# Patient Record
Sex: MALE | Race: BLACK OR AFRICAN AMERICAN | URBAN - METROPOLITAN AREA
[De-identification: names, ages, dates, MRNs, and addresses within clinical notes are randomized per-mention and may not be internally consistent; named-entity substitution may affect disease eponyms.]

---

## 2019-10-09 ENCOUNTER — APPOINTMENT (OUTPATIENT)
Dept: PHYSICAL THERAPY | Age: 54
End: 2019-10-09

## 2019-10-15 ENCOUNTER — HOSPITAL ENCOUNTER (OUTPATIENT)
Dept: PHYSICAL THERAPY | Age: 54
Discharge: HOME | End: 2019-10-15
Payer: COMMERCIAL

## 2019-10-15 PROCEDURE — 97162 PT EVAL MOD COMPLEX 30 MIN: CPT | Performed by: GENERAL ACUTE CARE HOSPITAL

## 2019-10-15 PROCEDURE — 97110 THERAPEUTIC EXERCISES: CPT | Performed by: GENERAL ACUTE CARE HOSPITAL

## 2019-10-15 NOTE — PROGRESS NOTES
[facility]  [place] [employer] [facility]  [address] [place] [address]  [phone] fax    Plan of Care/ Statement of Necessity for Physical Therapy Services    Patient name: Walt Dudley Start of Care: 10/15/2019   Referral source: Ayush Mcknight MD : 1965    Medical Diagnosis: Right shoulder pain [M25.511]  Payor: [facility] / Plan: [facility] PPO / Product Type: PPO /  Onset Date:10+ year ago   Treatment Diagnosis: Right shoulder pain [M25.511]   Prior Hospitalization: see medical history Provider#: [id number]   Medications: Verified on Patient summary List    Comorbidities: DM   Prior Level of Function: Functionally Indep, limited with OH reach    The Plan of Care and following information is based on the information from the initial evaluation. Assessment/ key information:   Pt is a 48 y/o male who presents with R shoulder pain. Pt states that pain began several years ago (10+ years) after sustaining a blow to head and fell onto R shoulder. Pt states that over the past 2 years pain has gradually increased. Pt reports having severe pain with current work related duties, as requires repeated overhead work. Pt admits to sleep disturbances at night due to R shoulder pain. Noted moderate limitations and impairments to left shoulder as well. Pt scored a 46/100 on FOTO indicating reduced functional mobility and QOL. Pain: 5/10, sharp and aching  Aggravating factors: laying down, reaching OH, repeated sup/pronation (using wrench)  Alleviating factors: sitting, rest  Occupation: general      Functional deficits: brush/cut hair, donning belt, donning/doffing shirt, working overhead at work, using wrench/screwdriver    Posture: forward flexed posture, forward head  Palpation: +ttp UT. Guarded.      Shoulder AROM:  Flexion:  R 100, L138 Abduction: R 60, L 115 ER: R 20 IR: R 30   FIR: R side of hip, L L3  PROM: R shoulder flexion: 120, abduction 90, ER 40, IR 50   (very guarded throughout)    Shoulder strength:  Flexion: L 3-/5 Abduction: L3-/5   ER: L 3-/5  IR: L 3-/5    C/S screen:   Flexion: 40 degrees Extension: 5-10   Lateral flexion: ~10 foam   Rotation: ~25 degrees       Special tests:   Neers: unable to assess due to high pain level  Nolberto: unable to assess due to high pain level  Speeds: unable to assess due to high pain level          Evaluation Complexity History MEDIUM  Complexity : 1-2 comorbidities / personal factors will impact the outcome/ POC ; Examination HIGH Complexity : 4+ Standardized tests and measures addressing body structure, function, activity limitation and / or participation in recreation  ;Presentation MEDIUM Complexity : Evolving with changing characteristics  ; Clinical Decision Making MEDIUM Complexity : FOTO score of 26-74  Overall Complexity Rating: MEDIUM  Problem List: pain affecting function, decrease ROM, decrease strength, decrease ADL/ functional abilitiies, decrease activity tolerance and decrease flexibility/ joint mobility   Treatment Plan may include any combination of the following: Therapeutic exercise, Therapeutic activities, Neuromuscular re-education, Physical agent/modality, Manual therapy, Patient education and Self Care training  Patient / Family readiness to learn indicated by: asking questions and trying to perform skills  Persons(s) to be included in education: patient (P)  Barriers to Learning/Limitations: None  Patient Goal (s): relief of pain  Patient Self Reported Health Status: good  Rehabilitation Potential: good    Short Term Goals: To be accomplished in 1 weeks:   1) Pt will be independent and compliant with HEP  Long Term Goals: To be accomplished in 4-6 weeks:   1) Pt will score 64/100 on FOTO indicating improved functional mobility and QOL.    2) Pt will display >120 shoulder flexion for reaching into cabinets   3) Pt will display 4/5 strength in R shoulder for performing work related duties   4) Pt will report <2/10 pain with daily functional tasks to show improved function  Frequency / Duration: Patient to be seen 2-3 times per week for 4-6 weeks. Patient/ CarPatient/ Caregiver education and instruction: Diagnosis, prognosis, activity modification and exercises   [x]  Plan of care has been reviewed with SONY Singh, PT 10/15/2019 11:53 AM    ________________________________________________________________________    I certify that the above Therapy Services are being furnished while the patient is under my care. I agree with the treatment plan and certify that this therapy is necessary.     Physician's Signature:____________Date:_________TIME:________    ** Signature, Date and Time must be completed for valid certification **    Please sign and return to [facility]  [address] [place] [address]  [phone] fax

## 2019-10-15 NOTE — PROGRESS NOTES
PT DAILY TREATMENT NOTE 8-    Patient Name: Kraan Castillo  Date:10/15/2019  : 1965  [x]  Patient  Verified  Payor: [employer] / Plan: [id number] PPO / Product Type: PPO /    In time:3:08  Out time:3:45  Total Treatment Time (min): 37  Total Timed Codes (min): 8  1:1 Treatment Time (min): 37   Visit #: 1 of 12    Treatment Area: Right shoulder pain [M25.511]    SUBJECTIVE  Pain Level (0-10 scale): 5/10  Any medication changes, allergies to medications, adverse drug reactions, diagnosis change, or new procedure performed?: [x] No    [] Yes (see summary sheet for update)  Subjective functional status/changes:   [] No changes reported  See POC    OBJECTIVE  Modality rationale: PD               8 min Therapeutic Exercise:  [x] See flow sheet :   Rationale: increase ROM and increase strength to improve the patients ability to perform upper body ADL's               min Patient Education: [x] Review HEP    [] Progressed/Changed HEP based on:   [] positioning   [] body mechanics   [] transfers   [] heat/ice application        Other Objective/Functional Measures:   Justification for Eval Code Complexity:  Patient History : see POC  Examination see exam   Clinical Presentation: evolving  Clinical Decision Making : FOTO : 46 /100    Pain Level (0-10 scale) post treatment: 510    ASSESSMENT/Changes in Function:   Initial evaluation completed, please see POC for complete details.    Pt given the following for initial HEP: scap retractions, c/s retractions, UT stretch, table flexion, wall walks    Patient will continue to benefit from skilled PT services to modify and progress therapeutic interventions, address functional mobility deficits, address ROM deficits, address strength deficits, analyze and address soft tissue restrictions, analyze and cue movement patterns, analyze and modify body mechanics/ergonomics, assess and modify postural abnormalities and instruct in home and community integration to attain remaining goals.      [x]  See Plan of Care  []  See progress note/recertification  []  See Discharge Summary         Progress towards goals / Updated goals:  See POC    PLAN  []  Upgrade activities as tolerated     [x]  Continue plan of care  []  Update interventions per flow sheet       []  Discharge due to:_  []  Other:_      Severa Lama, PT 10/15/2019  4:28 PM

## 2019-10-22 ENCOUNTER — HOSPITAL ENCOUNTER (OUTPATIENT)
Dept: PHYSICAL THERAPY | Age: 54
Discharge: HOME | End: 2019-10-22
Payer: COMMERCIAL

## 2019-10-22 PROCEDURE — 97110 THERAPEUTIC EXERCISES: CPT | Performed by: GENERAL ACUTE CARE HOSPITAL

## 2019-10-22 PROCEDURE — 97140 MANUAL THERAPY 1/> REGIONS: CPT | Performed by: GENERAL ACUTE CARE HOSPITAL

## 2019-10-22 NOTE — PROGRESS NOTES
PT DAILY TREATMENT NOTE     Patient Name: Ricarda Jones [facility]  Date:10/22/2019  : 1965  [x]  Patient  Verified  Payor: [employer] / Plan: [id number] PPO / Product Type: PPO /    In time: 3:32  Out time: 4:12  Total Treatment Time (min): 40  Total Timed Codes (min): 40  1:1 Treatment Time (min): 40   Visit #: 2 of 12    Treatment Area: Right shoulder pain [M25.511]    SUBJECTIVE  Pain Level (0-10 scale): 7/10  Any medication changes, allergies to medications, adverse drug reactions, diagnosis change, or new procedure performed?: [x] No    [] Yes (see summary sheet for update)  Subjective functional status/changes:   [] No changes reported  \"it just hurts anytime I use it. \"    OBJECTIVE  Modality rationale: decrease edema, decrease inflammation and decrease pain to improve the patients ability to tolerate reaching OH and ADL tasks    Min Type Additional Details    [] Estim: []Att   []Unatt  []TENS instruct                 []IFC  []Premod []NMES                       []Other:  []w/US   []w/ice   []w/heat  Position:  Location:    []  Traction: [] Cervical       []Lumbar                       [] Prone          []Supine                       []Intermittent   []Continuous Lbs:  [] before manual  [] after manual    []  Ultrasound: []Continuous   [] Pulsed                           []1MHz   []3MHz Location:  W/cm2:    []  Iontophoresis with dexamethasone         Location: [] Take home patch   [] In clinic   PD []  Ice     []  heat  []  Ice massage Position:  Location:    []  Vasopneumatic Device Pressure: [] lo [] med [] hi   Temp: [] lo [] med [] hi   [] Skin assessment post-treatment:  []intact []redness- no adverse reaction       []redness  adverse reaction:         30 min Therapeutic Exercise:  [x] See flow sheet :   Rationale: increase ROM and increase strength to improve the patients ability to perform upper body ADL's    10 min Manual Therapy:  STM to UT, pec release, PROM R shoulder in all planes with gentle oscillations. Rationale: decrease pain, increase ROM and increase tissue extensibility to perform functional UE tasks            x min Patient Education: [x] Review HEP    [] Progressed/Changed HEP based on:   [] positioning   [] body mechanics   [] transfers   [] heat/ice application        Other Objective/Functional Measures:   R shoulder AROM: flexion 110, abd 70    Pain Level (0-10 scale) post treatment: 4/10    ASSESSMENT/Changes in Function:   Fair tolerance to first FU session, requiring 100% verbal/tactile cues for correct performance of all newly introduced exercises. Noted moderate muscle guarding during PROM. Pt compensates with upper trap during finger ladder and scap retraction, provided tactile cues to correct form. 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 Patient will continue to benefit from skilled PT services to modify and progress therapeutic interventions, address functional mobility deficits, address ROM deficits, address strength deficits, analyze and address soft tissue restrictions, analyze and cue movement patterns, analyze and modify body mechanics/ergonomics, assess and modify postural abnormalities and instruct in home and community integration to attain remaining goals. [x]  See Plan of Care  []  See progress note/recertification  []  See Discharge Summary         Progress towards goals / Updated goals:  Short Term Goals: To be accomplished in 1 weeks:               1) Pt will be independent and compliant with HEP  Long Term Goals: To be accomplished in 4-6 weeks:               1) Pt will score 64/100 on FOTO indicating improved functional mobility and QOL.                2) Pt will display >120 shoulder flexion for reaching into cabinets               3) Pt will display 4/5 strength in R shoulder for performing work related duties               4) Pt will report <2/10 pain with daily functional tasks to show improved function    PLAN  []  Upgrade activities as tolerated     [x]  Continue plan of care  []  Update interventions per flow sheet       []  Discharge due to:_  []  Other:_  Assess response to first tx    Cintia Ayala, PT 10/22/2019  4:28 PM

## 2019-10-24 ENCOUNTER — HOSPITAL ENCOUNTER (OUTPATIENT)
Dept: PHYSICAL THERAPY | Age: 54
Discharge: HOME | End: 2019-10-24
Payer: COMMERCIAL

## 2019-10-24 PROCEDURE — 97110 THERAPEUTIC EXERCISES: CPT

## 2019-10-24 PROCEDURE — 97140 MANUAL THERAPY 1/> REGIONS: CPT

## 2019-10-24 NOTE — PROGRESS NOTES
PT DAILY TREATMENT NOTE     Patient Name: Brant Liu [facility]  Date:10/24/2019  : 1965  [x]  Patient  Verified  Payor: [employer] / Plan: [employer] [id number] PPO / Product Type: PPO /    In time: 4:00  Out time: 4:37  Total Treatment Time (min): 37  Total Timed Codes (min): 32  1:1 Treatment Time (min): 25  Visit #: 3 of 12    Treatment Area: Right shoulder pain [M25.511]    SUBJECTIVE  Pain Level (0-10 scale): 10  Any medication changes, allergies to medications, adverse drug reactions, diagnosis change, or new procedure performed?: [x] No    [] Yes (see summary sheet for update)  Subjective functional status/changes:   [] No changes reported  Pt reports continued increased front of the right shoulder pain. His job requires a lot of overhead repetitive work.      OBJECTIVE  Modality rationale: decrease edema, decrease inflammation and decrease pain to improve the patients ability to tolerate reaching OH and ADL tasks    Min Type Additional Details    [] Estim: []Att   []Unatt  []TENS instruct                 []IFC  []Premod []NMES                       []Other:  []w/US   []w/ice   []w/heat  Position:  Location:    []  Traction: [] Cervical       []Lumbar                       [] Prone          []Supine                       []Intermittent   []Continuous Lbs:  [] before manual  [] after manual    []  Ultrasound: []Continuous   [] Pulsed                           []1MHz   []3MHz Location:  W/cm2:    []  Iontophoresis with dexamethasone         Location: [] Take home patch   [] In clinic   5 []  Ice     []  heat  [x]  Ice massage Position:supine  Location: right subacromial space    []  Vasopneumatic Device Pressure: [] lo [] med [] hi   Temp: [] lo [] med [] hi   [x] Skin assessment post-treatment:  [x]intact []redness- no adverse reaction       []redness  adverse reaction:         21 min Therapeutic Exercise:  [x] See flow sheet :   Rationale: increase ROM and increase strength to improve the patients ability to perform upper body ADL's    11 min Manual Therapy:  TPR right pectorals and levator scap   Rationale: decrease pain, increase ROM and increase tissue extensibility to perform functional UE tasks             min Patient Education: [x] Review HEP    [] Progressed/Changed HEP based on:   [] positioning   [] body mechanics   [] transfers   [] heat/ice application        Other Objective/Functional Measures:   Right forward shoulder posture  Educated on pectoral stretching and posture  Early UT activation with shoulder elevation  Educated on ice massage to try for pain relief  Decreased posterior shoulder strength      Pain Level (0-10 scale) post treatment: 3/10    ASSESSMENT/Changes in Function: Pt limited by increased right shoulder pain with continued overuse for work overhead. He has forward shoulder with increased pectoral tightness and poor posterior kinetic chain strength. Will work to decrease inflammation and restore normal muscle balance for decreased pain with work related duties. Progress towards goals / Updated goals:  Short Term Goals: To be accomplished in 1 weeks:               1) Pt will be independent and compliant with HEP MET  Long Term Goals: To be accomplished in 4-6 weeks:               1) Pt will score 64/100 on FOTO indicating improved functional mobility and QOL.                2) Pt will display >120 shoulder flexion for reaching into cabinets               3) Pt will display 4/5 strength in R shoulder for performing work related duties               4) Pt will report <2/10 pain with daily functional tasks to show improved function    PLAN  [x]  Upgrade activities as tolerated     [x]  Continue plan of care  []  Update interventions per flow sheet       []  Discharge due to:_  []  Other:_    Vivi Olivares PTA 10/24/2019  4:28 PM

## 2019-10-28 ENCOUNTER — HOSPITAL ENCOUNTER (OUTPATIENT)
Dept: PHYSICAL THERAPY | Age: 54
Discharge: HOME | End: 2019-10-28
Payer: COMMERCIAL

## 2019-10-28 PROCEDURE — 97110 THERAPEUTIC EXERCISES: CPT

## 2019-10-28 NOTE — PROGRESS NOTES
PT DAILY TREATMENT NOTE 10-18    Patient Name: Celia Pardo [facility]  Date:10/28/2019  : 1965  [x]  Patient  Verified  Payor: [employer] / Plan: [employer] [id number] PPO / Product Type: PPO /    In time:3:00  Out time:3:52  Total Treatment Time (min): 52  Visit #: 4 of 12    Medicare/BCBS Only   Total Timed Codes (min):  42 1:1 Treatment Time:  25       Treatment Area: Right shoulder pain [M25.511]    SUBJECTIVE  Pain Level (0-10 scale): 4/10  Any medication changes, allergies to medications, adverse drug reactions, diagnosis change, or new procedure performed?: [x] No    [] Yes (see summary sheet for update)  Subjective functional status/changes:   [] No changes reported  Pt reports continued pain on the front of his right shoulder. He wants to get back to doing pushups again. He notes improvement of pain with ice massage, SNAGs and pectoral stretching. OBJECTIVE    42 min Therapeutic Exercise:  [x] See flow sheet :   Rationale: increase ROM and increase strength to improve the patients ability to perform job duties as a  with decreased pain       10 minutes of ice to the right shoulder post exercises in sitting to reduce pain for ease of sleeping and working          With   [] TE   [] TA   [] neuro   [] other: Patient Education: [x] Review HEP    [] Progressed/Changed HEP based on:   [] positioning   [] body mechanics   [] transfers   [] heat/ice application    [] other:      Other Objective/Functional Measures:   Reviewed active release using tennis ball for the pectoral muscles  Educated on posture awareness with scap squeeze throughout the day  Provided with PTB for rows  Educated to hold on anterior strengthening such as push ups due to current muscle imbalanced  Increased UT activation with shoulder motions     Pain Level (0-10 scale) post treatment: 4/10    ASSESSMENT/Changes in Function: Pt continues with moderate right shoulder pain especially with prolonged work.  He has forward shoulder posture with decreased posterior shoulder strength. He has increased UT activation contributing to impingement like symptoms. Will work to restore neutral shoulder positioning versus anterior glided humeral head for reduced pain performing  work. Progress towards goals / Updated goals:  Short Term Goals: To be accomplished in 1 weeks:  1) Pt will be independent and compliant with HEP MET  Long Term Goals: To be accomplished in 4-6 weeks:  1) Pt will score 64/100 on FOTO indicating improved functional mobility and QOL.   2) Pt will display >120 shoulder flexion for reaching into cabinets  3) Pt will display 4/5 strength in R shoulder for performing work related duties  4) Pt will report <2/10 pain with daily functional tasks to show improved function    PLAN  [x]  Upgrade activities as tolerated     [x]  Continue plan of care  []  Update interventions per flow sheet       []  Discharge due to:_  []  Other:_      Zhou Salinas PTA 10/28/2019  2:01 PM    Future Appointments   Date Time Provider Alice Olmos   10/28/2019  3:00 PM Ashlyn Fisher, PTA [facility]   10/30/2019  3:00 PM Ashlyn Fisher, PTA [facility]   11/4/2019  3:00 PM Ashlyn Phillip, PTA [facility]   11/7/2019  3:30 PM Ashlyn Phillip, PTA [facility]   11/12/2019  3:30 PM Ashlyn Phillip, PTA [facility]   11/14/2019  3:00 PM Ashlyn Phillip, PTA [facility]

## 2019-10-30 ENCOUNTER — HOSPITAL ENCOUNTER (OUTPATIENT)
Dept: PHYSICAL THERAPY | Age: 54
Discharge: HOME | End: 2019-10-30
Payer: COMMERCIAL

## 2019-10-30 PROCEDURE — 97110 THERAPEUTIC EXERCISES: CPT

## 2019-10-30 PROCEDURE — 97140 MANUAL THERAPY 1/> REGIONS: CPT

## 2019-10-30 NOTE — PROGRESS NOTES
PT DAILY TREATMENT NOTE 10-18    Patient Name: Shital Romero [facility]  Date:10/30/2019  : 1965  [x]  Patient  Verified  Payor: BLUE [employer]OSS / Plan: [facility] PPO / Product Type: PPO /    In time:3:02  Out time: 3:57  Total Treatment Time (min): 52  Visit #: 5 of 12    Medicare/BCBS Only   Total Timed Codes (min):  42 1:1 Treatment Time:  40       Treatment Area: Right shoulder pain [M25.511]    SUBJECTIVE  Pain Level (0-10 scale): 5-6/10  Any medication changes, allergies to medications, adverse drug reactions, diagnosis change, or new procedure performed?: [x] No    [] Yes (see summary sheet for update)  Subjective functional status/changes:   [] No changes reported  Pt reports having more pain today as he's been lifting a lot at work    OBJECTIVE    Modality rationale: decrease edema, decrease inflammation, decrease pain and increase tissue extensibility to improve the patients ability to tolerate ADLs   Min Type Additional Details    [] Estim:  []Unatt       []IFC  []Premod                        []Other:  []w/ice   []w/heat  Position:  Location:    [] Estim: []Att    []TENS instruct  []NMES                    []Other:  []w/US   []w/ice   []w/heat  Position:  Location:    []  Traction: [] Cervical       []Lumbar                       [] Prone          []Supine                       []Intermittent   []Continuous Lbs:  [] before manual  [] after manual    []  Ultrasound: []Continuous   [] Pulsed                           []1MHz   []3MHz W/cm2:  Location:    []  Iontophoresis with dexamethasone         Location: [] Take home patch   [] In clinic   10 [x]  Ice right GH     [x]  Heat Right upper Trap and pect  []  Ice massage  []  Laser   []  Anodyne Position: supine  Location: as described    []  Laser with stim  []  Other:  Position:  Location:    []  Vasopneumatic Device Pressure:       [] lo [] med [] hi   Temperature: [] lo [] med [] hi   [x] Skin assessment post-treatment:  [x]intact []redness- no adverse reaction    []redness  adverse reaction:       32 min Therapeutic Exercise:  [x] See flow sheet :   Rationale: increase ROM, increase strength, improve coordination and increase proprioception to improve the patients ability to improve ease with ADLs/job duties    10 min Manual Therapy:  DTM/TRP for Right UT and pect   Rationale: decrease pain, increase ROM, increase tissue extensibility and decrease trigger points to tolerate ADLs         With   [] TE   [] TA   [] neuro   [] other: Patient Education: [x] Review HEP    [] Progressed/Changed HEP based on:   [] positioning   [] body mechanics   [] transfers   [] heat/ice application    [] other:      Other Objective/Functional Measures:    AAROM deemed WFL but unable to tolerate AROM, due to pain and weakness? Cont to compensate with UT, improved min after cue     Pain Level (0-10 scale) post treatment: 4/10    ASSESSMENT/Changes in Function: pt demonstrates good improvement with ROM but cont to present with mod pain, fair Mid and Lower trap strength. Will perform trial of KT for UT inhibition next visit. Patient will continue to benefit from skilled PT services to modify and progress therapeutic interventions, address functional mobility deficits, address ROM deficits, address strength deficits, analyze and address soft tissue restrictions, analyze and cue movement patterns, analyze and modify body mechanics/ergonomics, assess and modify postural abnormalities and instruct in home and community integration to attain remaining goals.      [x]  See Plan of Care  []  See progress note/recertification  []  See Discharge Summary         Progress towards goals / Updated goals:  Progress towards goals / Updated goals:  Short Term Goals: To be accomplished in 1 weeks:  1) Pt will be independent and compliant with HEP MET  Long Term Goals: To be accomplished in 4-6 weeks:  1) Pt will score 64/100 on FOTO indicating improved functional mobility and QOL.  2) Pt will display >120 shoulder flexion for reaching into cabinets  3) Pt will display 4/5 strength in R shoulder for performing work related duties  4) Pt will report <2/10 pain with daily functional tasks to show improved function    PLAN  [x]  Upgrade activities as tolerated     [x]  Continue plan of care  []  Update interventions per flow sheet       []  Discharge due to:_  []  Other:_      Akin Horace, PT 10/30/2019  9:51 AM    Future Appointments   Date Time Provider Alice Olmos   10/30/2019  3:00 PM Regino Jeff [id number]   11/4/2019  3:00 PM Paola Layton, PTA [facility]   11/7/2019  3:30 PM Paola Layton, PTA [facility]   11/12/2019  3:30 PM Paola Layton, PTA [facility]   11/14/2019  3:00 PM Paola Layton, PTA [facility]

## 2019-11-04 ENCOUNTER — HOSPITAL ENCOUNTER (OUTPATIENT)
Dept: PHYSICAL THERAPY | Age: 54
Discharge: HOME | End: 2019-11-04
Payer: COMMERCIAL

## 2019-11-04 PROCEDURE — 97110 THERAPEUTIC EXERCISES: CPT

## 2019-11-04 NOTE — PROGRESS NOTES
PT DAILY TREATMENT NOTE 10-18    Patient Name: Prince Olguin [facility]  Date:2019  : 1965  [x]  Patient  Verified  Payor: BLUE CROSS / Plan: [facility] PPO / Product Type: PPO /    In time: 3:00  Out time: 4:02  Total Treatment Time (min): 62  Visit #: 6 of 12    Medicare/BCBS Only   Total Timed Codes (min):  25 1:1 Treatment Time:  52       Treatment Area: Right shoulder pain [M25.511]    SUBJECTIVE  Pain Level (0-10 scale): 6/10  Any medication changes, allergies to medications, adverse drug reactions, diagnosis change, or new procedure performed?: [x] No    [] Yes (see summary sheet for update)  Subjective functional status/changes:   [] No changes reported  Pt reports mod pain along his UT, scalene, and top of Right shoulder.     OBJECTIVE    Modality rationale: decrease edema, decrease inflammation, decrease pain and increase tissue extensibility to improve the patients ability to tolerate ADLs   Min Type Additional Details    [] Estim:  []Unatt       []IFC  []Premod                        []Other:  []w/ice   []w/heat  Position:  Location:    [] Estim: []Att    []TENS instruct  []NMES                    []Other:  []w/US   []w/ice   []w/heat  Position:  Location:    []  Traction: [] Cervical       []Lumbar                       [] Prone          []Supine                       []Intermittent   []Continuous Lbs:  [] before manual  [] after manual    []  Ultrasound: []Continuous   [] Pulsed                           []1MHz   []3MHz W/cm2:  Location:    []  Iontophoresis with dexamethasone         Location: [] Take home patch   [] In clinic   10 [x]  Ice right [address] joint    [x]  Heat Right upper trap and pect  []  Ice massage  []  Laser   []  Anodyne Position: supine  Location: as descirbed    []  Laser with stim  []  Other:  Position:  Location:    []  Vasopneumatic Device Pressure:       [] lo [] med [] hi   Temperature: [] lo [] med [] hi   [] Skin assessment post-treatment:  []intact []redness- no adverse reaction    []redness  adverse reaction:     37 min Therapeutic Exercise:  [x] See flow sheet :   Rationale: increase ROM, increase strength, improve coordination and increase proprioception to improve the patients ability to perform ADLs/job duties    15 min Manual Therapy:  KT to inhibit Right UT   Rationale: decrease pain, increase tissue extensibility and decrease trigger points to improve pt's tolerance for ADLs. With   [] TE   [] TA   [] neuro   [] other: Patient Education: [x] Review HEP    [] Progressed/Changed HEP based on:   [] positioning   [] body mechanics   [] transfers   [] heat/ice application    [] other:      Other Objective/Functional Measures:    Cont to reports most tightness with UT stretch on right   Fear avoidance with using KZ but no increase of pain reported   Unable to correct UT compensation during therex    Max challenged with lower trap and mid trap strengthening in prone    Pain Level (0-10 scale) post treatment: 3-4/10    ASSESSMENT/Changes in Function: pt cont to present with mod pain and compensation with UT. Performed trial of KT, will re-assess next visit, consider Combo to improve pain and muscle flexibility. Progress strengthening as tolerated     Patient will continue to benefit from skilled PT services to modify and progress therapeutic interventions, address functional mobility deficits, address ROM deficits, address strength deficits, analyze and address soft tissue restrictions, analyze and cue movement patterns, analyze and modify body mechanics/ergonomics, assess and modify postural abnormalities, address imbalance/dizziness and instruct in home and community integration to attain remaining goals.      [x]  See Plan of Care  []  See progress note/recertification  []  See Discharge Summary         Progress towards goals / Updated goals:  Short Term Goals: To be accomplished in 1 weeks:  1) Pt will be independent and compliant with HEP MET    Long Term Goals: To be accomplished in 4-6 weeks:  1) Pt will score 64/100 on FOTO indicating improved functional mobility and QOL.   2) Pt will display >120 shoulder flexion for reaching into cabinets  3) Pt will display 4/5 strength in R shoulder for performing work related duties  4) Pt will report <2/10 pain with daily functional tasks to show improved function no change cont to c/o mod pain overall 11-4-19    PLAN  [x]  Upgrade activities as tolerated     [x]  Continue plan of care  []  Update interventions per flow sheet       []  Discharge due to:_  []  Other:_      Yanet Patterson, PT 11/4/2019  12:54 PM    Future Appointments   Date Time Provider Alice Olmos   11/4/2019  3:00 PM William LUCAS [facility]   11/7/2019  3:30 PM Florida Rivera, PTA [facility]   11/12/2019  3:30 PM Florida Rivera, PTA [facility]   11/14/2019  3:00 PM Florida Rivera PTA [facility]

## 2019-11-07 ENCOUNTER — HOSPITAL ENCOUNTER (OUTPATIENT)
Dept: PHYSICAL THERAPY | Age: 54
Discharge: HOME | End: 2019-11-07
Payer: COMMERCIAL

## 2019-11-07 PROCEDURE — 97110 THERAPEUTIC EXERCISES: CPT

## 2019-11-07 NOTE — PROGRESS NOTES
PT DAILY TREATMENT NOTE 10-18    Patient Name: Karan Hernandes [facility]  Date:2019  : 1965  [x]  Patient  Verified  Payor: BLUE CROSS / Plan: [facility] PPO / Product Type: PPO /    In time: 3:32   Out time: 4:34  Total Treatment Time (min):  62  Visit #: 7 of 12     Medicare/BCBS Only   Total Timed Codes (min):  52 1:1 Treatment Time:  30       Treatment Area: Right shoulder pain [M25.511]    SUBJECTIVE  Pain Level (0-10 scale): 10  Any medication changes, allergies to medications, adverse drug reactions, diagnosis change, or new procedure performed?: [x] No    [] Yes (see summary sheet for update)  Subjective functional status/changes:   [] No changes reported  Pt reports increased right shoulder pain from work today. He feels the tape helped with his pain and posture. He has a hard time not shrugging his shoulders when he works. He has right side neck pain as well.        OBJECTIVE    Modality rationale: decrease edema, decrease inflammation, decrease pain and increase tissue extensibility to improve the patients ability to tolerate ADLs   Min Type Additional Details    [] Estim:  []Unatt       []IFC  []Premod                        []Other:  []w/ice   []w/heat  Position:  Location:    [] Estim: []Att    []TENS instruct  []NMES                    []Other:  []w/US   []w/ice   []w/heat  Position:  Location:    []  Traction: [] Cervical       []Lumbar                       [] Prone          []Supine                       []Intermittent   []Continuous Lbs:  [] before manual  [] after manual    []  Ultrasound: []Continuous   [] Pulsed                           []1MHz   []3MHz W/cm2:  Location:    []  Iontophoresis with dexamethasone         Location: [] Take home patch   [] In clinic   10 [x]  Ice right [address] joint    [x]  Heat Right upper trap and pect  []  Ice massage  []  Laser   []  Anodyne Position: supine  Location: as descirbed    []  Laser with stim  []  Other:  Position:  Location: []  Vasopneumatic Device Pressure:       [] lo [] med [] hi   Temperature: [] lo [] med [] hi   [] Skin assessment post-treatment:  []intact []redness- no adverse reaction    []redness  adverse reaction:     42 min Therapeutic Exercise:  [x] See flow sheet :   Rationale: increase ROM, increase strength, improve coordination and increase proprioception to improve the patients ability to perform ADLs/job duties    NC-10 min Manual Therapy:  KT by ATC to inhibit right UT and posterior glide GH joint   Rationale: decrease pain, increase tissue extensibility and decrease trigger points to improve pt's tolerance for ADLs. With   [] TE   [] TA   [] neuro   [] other: Patient Education: [x] Review HEP    [] Progressed/Changed HEP based on:   [] positioning   [] body mechanics   [] transfers   [] heat/ice application    [] other:      Other Objective/Functional Measures:   Challenged with Lower trap facilitation in prone  Added TB Ws to work on posture  Able to reduce UT compensation with increased height of handles on jaden to keep elbows at 90 degrees  Educated to continue with pectoral stretching and be aware of hiking in the shoulders at work     Pain Level (0-10 scale) post treatment: 2/10    ASSESSMENT/Changes in Function: Pt is making slow progress towards goals. He has habitual UT compensations with shoulder mobility and utilizes it daily at work. He has decreased ER and lower trap strength. He continues with forward shoulder posture due to pectoral tightness. Will continue to focus on posterior shoulder strengthening with body awareness to reduce UT compensation during work activities. Progress towards goals / Updated goals:  Short Term Goals: To be accomplished in 1 weeks:  1) Pt will be independent and compliant with HEP MET    Long Term Goals: To be accomplished in 4-6 weeks:  1) Pt will score 64/100 on FOTO indicating improved functional mobility and QOL.   2) Pt will display >120 shoulder flexion for reaching into cabinets  3) Pt will display 4/5 strength in R shoulder for performing work related duties  4) Pt will report <2/10 pain with daily functional tasks to show improved function no change cont to c/o mod pain overall 11-4-19    PLAN  [x]  Upgrade activities as tolerated     [x]  Continue plan of care  []  Update interventions per flow sheet       []  Discharge due to:_  []  Other:_      Agustín Box PTA, PT 11/7/2019  12:54 PM    Future Appointments   Date Time Provider Alice Olmos   11/7/2019  3:30 PM Wells Serum, PTA [facility]   11/12/2019  3:30 PM Wells Serum, PTA [facility]   11/14/2019  3:00 PM Wells Serum, PTA [facility]

## 2019-11-12 ENCOUNTER — HOSPITAL ENCOUNTER (OUTPATIENT)
Dept: PHYSICAL THERAPY | Age: 54
Discharge: HOME | End: 2019-11-12
Payer: COMMERCIAL

## 2019-11-12 PROCEDURE — 97110 THERAPEUTIC EXERCISES: CPT

## 2019-11-12 NOTE — PROGRESS NOTES
PT DAILY TREATMENT NOTE 10-18    Patient Name: Nicki Kingsley Expose  Date:2019  : 1965  [x]  Patient  Verified  Payor: BLUE THAD / Plan: [facility] [employer] [id number] PPO / Product Type: PPO /    In time: 3:30   Out time: 4:20  Total Treatment Time (min):  50  Visit #: 8 of 12     Medicare/BCBS Only   Total Timed Codes (min): 40 1:1 Treatment Time:  30       Treatment Area: Right shoulder pain [M25.511]    SUBJECTIVE  Pain Level (0-10 scale): 2-3/10  Any medication changes, allergies to medications, adverse drug reactions, diagnosis change, or new procedure performed?: [x] No    [] Yes (see summary sheet for update)  Subjective functional status/changes:   [] No changes reported  Pt states overall improvement in pain but still irritated with working and performing long activities. His left knee has been bothering him as well lately going up and down stairs.          OBJECTIVE    Modality rationale: decrease edema, decrease inflammation, decrease pain and increase tissue extensibility to improve the patients ability to tolerate ADLs   Min Type Additional Details    [] Estim:  []Unatt       []IFC  []Premod                        []Other:  []w/ice   []w/heat  Position:  Location:    [] Estim: []Att    []TENS instruct  []NMES                    []Other:  []w/US   []w/ice   []w/heat  Position:  Location:    []  Traction: [] Cervical       []Lumbar                       [] Prone          []Supine                       []Intermittent   []Continuous Lbs:  [] before manual  [] after manual    []  Ultrasound: []Continuous   [] Pulsed                           []1MHz   []3MHz W/cm2:  Location:    []  Iontophoresis with dexamethasone         Location: [] Take home patch   [] In clinic   10 [x]  Ice right [address] joint    [x]  Heat Right upper trap and pect  []  Ice massage  []  Laser   []  Anodyne Position: supine  Location: as descirbed    []  Laser with stim  []  Other:  Position:  Location:    []  Vasopneumatic Device Pressure:       [] lo [] med [] hi   Temperature: [] lo [] med [] hi   [] Skin assessment post-treatment:  []intact []redness- no adverse reaction    []redness  adverse reaction:     40 min Therapeutic Exercise:  [x] See flow sheet :   Rationale: increase ROM, increase strength, improve coordination and increase proprioception to improve the patients ability to perform ADLs/job duties      With   [] TE   [] TA   [] neuro   [] other: Patient Education: [x] Review HEP    [] Progressed/Changed HEP based on:   [] positioning   [] body mechanics   [] transfers   [] heat/ice application    [] other:      Other Objective/Functional Measures: Forward shoulder posture continues  Challenged with lower trap strengthening and scap retraction  Fatigue with ER  No increased pain during session    Pain Level (0-10 scale) post treatment: 0/10    ASSESSMENT/Changes in Function: Pt progressing towards all goals with reduction of pain overall and improve posture awareness. He continues to have forward shoulder posture due to work job type and poor posterior kinetic chain strength. Will continue to improve posture awareness and improve scapulohumeral rhythm with decreased UT compensation for decreased shoulder pain. Progress towards goals / Updated goals:  Short Term Goals: To be accomplished in 1 weeks:  1) Pt will be independent and compliant with HEP MET    Long Term Goals: To be accomplished in 4-6 weeks:  1) Pt will score 64/100 on FOTO indicating improved functional mobility and QOL.   2) Pt will display >120 shoulder flexion for reaching into cabinets  3) Pt will display 4/5 strength in R shoulder for performing work related duties  4) Pt will report <2/10 pain with daily functional tasks to show improved function no change cont to c/o mod pain overall 11-4-19    PLAN  [x]  Upgrade activities as tolerated     [x]  Continue plan of care  []  Update interventions per flow sheet       []  Discharge due to:_  [] Other:_      Ghanshyam Telles PTA, PT 11/12/2019  12:54 PM    Future Appointments   Date Time Provider Alice Olmos   11/12/2019  3:30 PM Iveth Parkinson PTA [facility]   11/14/2019  3:00 PM Iveth Parkinson PTA [facility]

## 2019-11-14 ENCOUNTER — HOSPITAL ENCOUNTER (OUTPATIENT)
Dept: PHYSICAL THERAPY | Age: 54
Discharge: HOME | End: 2019-11-14
Payer: COMMERCIAL

## 2019-11-14 PROCEDURE — 97110 THERAPEUTIC EXERCISES: CPT

## 2019-11-14 NOTE — PROGRESS NOTES
PT DAILY TREATMENT NOTE 10-18    Patient Name: Wendy Mcqueen [facility]  Date:2019  : 1965  [x]  Patient  Verified  Payor: BLUE THAD / Plan: [id number] PPO / Product Type: PPO /    In time: 3:00   Out time: 3:48  Total Treatment Time (min):  48  Visit #:9 of 12    Medicare/BCBS Only   Total Timed Codes (min): 38 1:1 Treatment Time:  38       Treatment Area: Right shoulder pain [M25.511]    SUBJECTIVE  Pain Level (0-10 scale): 1/10  Any medication changes, allergies to medications, adverse drug reactions, diagnosis change, or new procedure performed?: [x] No    [] Yes (see summary sheet for update)  Subjective functional status/changes:   [] No changes reported  Pt reports 3/10 average pain with overall improvements. He is more aware of his posture. He still has pain with prolonged [place] work duties.        OBJECTIVE    Modality rationale: decrease edema, decrease inflammation, decrease pain and increase tissue extensibility to improve the patients ability to tolerate ADLs   Min Type Additional Details    [] Estim:  []Unatt       []IFC  []Premod                        []Other:  []w/ice   []w/heat  Position:  Location:    [] Estim: []Att    []TENS instruct  []NMES                    []Other:  []w/US   []w/ice   []w/heat  Position:  Location:    []  Traction: [] Cervical       []Lumbar                       [] Prone          []Supine                       []Intermittent   []Continuous Lbs:  [] before manual  [] after manual    []  Ultrasound: []Continuous   [] Pulsed                           []1MHz   []3MHz W/cm2:  Location:    []  Iontophoresis with dexamethasone         Location: [] Take home patch   [] In clinic   10 [x]  Ice right [address] joint    [x]  Heat Right upper trap and pect  []  Ice massage  []  Laser   []  Anodyne Position: supine  Location: as descirbed    []  Laser with stim  []  Other:  Position:  Location:    []  Vasopneumatic Device Pressure:       [] lo [] med [] hi   Temperature: [] lo [] med [] hi   [x] Skin assessment post-treatment:  [x]intact []redness- no adverse reaction    []redness  adverse reaction:     38 min Therapeutic Exercise:  [x] See flow sheet :   Rationale: increase ROM, increase strength, improve coordination and increase proprioception to improve the patients ability to perform ADLs/job duties      With   [] TE   [] TA   [] neuro   [] other: Patient Education: [x] Review HEP    [] Progressed/Changed HEP based on:   [] positioning   [] body mechanics   [] transfers   [] heat/ice application    [] other:      Other Objective/Functional Measures: FOTO: 62  Shoulder flexion AROM: 145 degrees  Right shoulder strength: flexion 4/5 with pain abduction 4-/5  IR 4/5 ER 4-/5  Pain average: 3/10    Pain Level (0-10 scale) post treatment: 0/10    ASSESSMENT/Changes in Function: See Progress Note. Progress towards goals / Updated goals:  Short Term Goals: To be accomplished in 1 weeks:  1) Pt will be independent and compliant with HEP MET  Long Term Goals: To be accomplished in 4-6 weeks:  1) Pt will score 64/100 on FOTO indicating improved functional mobility and QOL. Progressing  2) Pt will display >120 shoulder flexion for reaching into cabinets. MET  3) Pt will display 4/5 strength in R shoulder for performing work related dutiesprogressing  4) Pt will report <2/10 pain with daily functional tasks to show.  improved function no change cont to c/o mod pain overall 11-4-19 3/10    PLAN  [x]  Upgrade activities as tolerated     [x]  Continue plan of care  []  Update interventions per flow sheet       []  Discharge due to:_  []  Other:_      Jazmín Morillo PTA, PT 11/14/2019  12:54 PM    Future Appointments   Date Time Provider Alice Olmos   11/14/2019  3:00 PM [facility]

## 2019-11-15 NOTE — PROGRESS NOTES
[facility]  [address] [facility]  [phone] fax    Physical Therapy Progress Note  Patient name: April Melissa Start of Care: 10/15/2019   Referral source: Maria Guadalupe Collazo MD : 1965   Medical/Treatment Diagnosis: Right shoulder pain [M25.511]  Payor: [facility] / Plan: [facility] PPO / Product Type: PPO /  Onset Date:10+ years ago     Prior Hospitalization: see medical history Provider#: [id number]   Medications: Verified on Patient Summary List    Comorbidities: DM  Prior Level of Function:Functionally Indep, limited with [place] reach  Visits from Start of Care: 9    Missed Visits: 0    Established Goals:         Excellent           Good         Limited           None  [x] Increased ROM   []  [x]  []  []  [x] Increased Strength  []  [x]  []  []  [x] Increased Mobility  []  [x]  []  []   [x] Decreased Pain   []  [x]  []  []  [] Decreased Swelling  []  []  []  []    Key Functional Changes: Decreased average pain; Improved AROM; Improved postural awareness    Progress towards goals / Updated goals:  Short Term Goals: To be accomplished in 1 weeks:  1) Pt will be independent and compliant with HEP MET  Long Term Goals: To be accomplished in 4-6 weeks:  1) Pt will score 64/100 on FOTO indicating improved functional mobility and QOL. Progressing  2) Pt will display >120 shoulder flexion for reaching into cabinets. MET  3) Pt will display 4/5 strength in R shoulder for performing work related dutiesprogressing  4) Pt will report <2/10 pain with daily functional tasks to show. improved function no change cont to c/o mod pain overall 19 3/10       Updated Goals: to be achieved in 6 weeks:  1) Pt will score 64/100 on FOTO indicating improved functional mobility and QOL. 2) Pt will display 4/5 strength in R shoulder for performing work related duties  3) Pt will report <2/10 pain with daily work duties.   4) Pt will be independent with final HEP for continued management of pain control upon D/C. ASSESSMENT/RECOMMENDATIONS: Mr. Anton Castillo is making good progress in therapy with decreased average pain to 3/10. He has improving right shoulder AROM but continues to have forward shoulder posture due to pectoral dominance with work duties. He has decreased posterior shoulder strength especially the lower trap and external rotations that are allowing for anterior humeral head translation and impingement symptoms. Skilled PT is medically necessary to improve posture and strength for ease of working with decreased pain. [x]Continue therapy per initial plan/protocol at a frequency of  2 x per week for 6 weeks  []Continue therapy with the following recommended changes:_____________________      _____________________________________________________________________  []Discontinue therapy progressing towards or have reached established goals  []Discontinue therapy due to lack of appreciable progress towards goals  []Discontinue therapy due to lack of attendance or compliance  []Await Physician's recommendations/decisions regarding therapy  []Other:________________________________________________________________    Thank you for this referral.   Negro Perez, PTA 11/15/2019 6:14 PM    NOTE TO PHYSICIAN:  [address]   FAX TO [facility]: [phone]  If you are unable to process this request in 24 hours please contact our office: [phone] I have read the above report and request that my patient continue as recommended. ? I have read the above report and request that my patient continue therapy with the following changes/special instructions:____________________________________  ? I have read the above report and request that my patient be discharged from therapy.     Physicians signature: ______________________________Date: ______Time:______

## 2019-12-26 NOTE — PROGRESS NOTES
[facility] [place]  [address] [facility]  [phone] fax    Physical Therapy Discharge Summary    Patient name: Maged Messer Start of Care: 10/15/2019   Referral source: Claudy Mark MD : 1965   Medical/Treatment Diagnosis: Right shoulder pain [M25.511]  Payor: [facility] / Plan: [facility] PPO / Product Type: PPO /  Onset Date:10+ years ago      Prior Hospitalization: see medical history Provider#: [id number]   Medications: Verified on Patient Summary List     Comorbidities: DM  Prior Level of Function:Functionally Indep, limited with [place] reach    Visits from Start of Care: 9                                      Missed Visits: 0    Reporting Period : 10- to 2019    Summary of Care:  Goal: Pt will score 64/100 on FOTO indicating improved functional mobility and QOL. Status at last note/certification: progressing   Status at discharge: not met    Goal: Pt will display 4/5 strength in R shoulder for performing work related duties  Status at last note/certification: progressing steadily   Status at discharge: not met    Goal: Pt will report <2/10 pain with daily work duties. Status at last note/certification: improved function no change cont to c/o mod pain overall 19 3/10  Status at discharge: not met    Goal: Pt will be independent with final HEP for continued management of pain control upon D/C  Status at last note/certification: not met  Status at discharge: not met      ASSESSMENT/RECOMMENDATIONS: pt making good progress with PT; please see last Progress Note for more details. Pt demonstrates poor compliance with cont PT as planned.      [x]Discontinue therapy: []Patient has reached or is progressing toward set goals      [x]Patient is non-compliant or has abdicated      []Due to lack of appreciable progress towards set goals    Soha Mascorro, PT 2019 11:25 AM

## 2020-06-24 ENCOUNTER — APPOINTMENT (OUTPATIENT)
Dept: PHYSICAL THERAPY | Age: 55
End: 2020-06-24

## 2020-07-31 ENCOUNTER — INPATIENT (INPATIENT)
Facility: HOSPITAL | Age: 55
LOS: 4 days | Discharge: HOME | DRG: 948 | End: 2020-08-05
Attending: INTERNAL MEDICINE | Admitting: INTERNAL MEDICINE
Payer: MEDICAID

## 2020-07-31 VITALS
SYSTOLIC BLOOD PRESSURE: 165 MMHG | DIASTOLIC BLOOD PRESSURE: 89 MMHG | RESPIRATION RATE: 18 BRPM | OXYGEN SATURATION: 100 % | TEMPERATURE: 99 F | HEART RATE: 92 BPM

## 2020-07-31 DIAGNOSIS — D33.2 BENIGN NEOPLASM OF BRAIN, UNSPECIFIED: Chronic | ICD-10-CM

## 2020-07-31 DIAGNOSIS — Z98.890 OTHER SPECIFIED POSTPROCEDURAL STATES: Chronic | ICD-10-CM

## 2020-07-31 DIAGNOSIS — Z89.519 ACQUIRED ABSENCE OF UNSPECIFIED LEG BELOW KNEE: Chronic | ICD-10-CM

## 2020-07-31 DIAGNOSIS — Z95.1 PRESENCE OF AORTOCORONARY BYPASS GRAFT: Chronic | ICD-10-CM

## 2020-07-31 LAB
ALBUMIN SERPL ELPH-MCNC: 3.9 G/DL — SIGNIFICANT CHANGE UP (ref 3.3–5)
ALBUMIN SERPL ELPH-MCNC: 4.5 G/DL — SIGNIFICANT CHANGE UP (ref 3.3–5)
ALP SERPL-CCNC: 88 U/L — SIGNIFICANT CHANGE UP (ref 40–120)
ALP SERPL-CCNC: SIGNIFICANT CHANGE UP (ref 40–120)
ALT FLD-CCNC: 18 U/L — SIGNIFICANT CHANGE UP (ref 10–45)
ALT FLD-CCNC: SIGNIFICANT CHANGE UP (ref 10–45)
ANION GAP SERPL CALC-SCNC: 10 MMOL/L — SIGNIFICANT CHANGE UP (ref 5–17)
ANION GAP SERPL CALC-SCNC: 13 MMOL/L — SIGNIFICANT CHANGE UP (ref 5–17)
APTT BLD: 31.3 SEC — SIGNIFICANT CHANGE UP (ref 27.5–35.5)
AST SERPL-CCNC: 20 U/L — SIGNIFICANT CHANGE UP (ref 10–40)
AST SERPL-CCNC: SIGNIFICANT CHANGE UP (ref 10–40)
BASOPHILS # BLD AUTO: 0.05 K/UL — SIGNIFICANT CHANGE UP (ref 0–0.2)
BASOPHILS NFR BLD AUTO: 0.7 % — SIGNIFICANT CHANGE UP (ref 0–2)
BILIRUB SERPL-MCNC: 0.4 MG/DL — SIGNIFICANT CHANGE UP (ref 0.2–1.2)
BILIRUB SERPL-MCNC: 0.5 MG/DL — SIGNIFICANT CHANGE UP (ref 0.2–1.2)
BUN SERPL-MCNC: 19 MG/DL — SIGNIFICANT CHANGE UP (ref 7–23)
BUN SERPL-MCNC: 21 MG/DL — SIGNIFICANT CHANGE UP (ref 7–23)
CALCIUM SERPL-MCNC: 9.2 MG/DL — SIGNIFICANT CHANGE UP (ref 8.4–10.5)
CALCIUM SERPL-MCNC: 9.3 MG/DL — SIGNIFICANT CHANGE UP (ref 8.4–10.5)
CHLORIDE SERPL-SCNC: 100 MMOL/L — SIGNIFICANT CHANGE UP (ref 96–108)
CHLORIDE SERPL-SCNC: 104 MMOL/L — SIGNIFICANT CHANGE UP (ref 96–108)
CO2 SERPL-SCNC: 26 MMOL/L — SIGNIFICANT CHANGE UP (ref 22–31)
CO2 SERPL-SCNC: 28 MMOL/L — SIGNIFICANT CHANGE UP (ref 22–31)
CREAT SERPL-MCNC: 0.82 MG/DL — SIGNIFICANT CHANGE UP (ref 0.5–1.3)
CREAT SERPL-MCNC: 0.88 MG/DL — SIGNIFICANT CHANGE UP (ref 0.5–1.3)
EOSINOPHIL # BLD AUTO: 0.27 K/UL — SIGNIFICANT CHANGE UP (ref 0–0.5)
EOSINOPHIL NFR BLD AUTO: 3.5 % — SIGNIFICANT CHANGE UP (ref 0–6)
GLUCOSE SERPL-MCNC: 202 MG/DL — HIGH (ref 70–99)
GLUCOSE SERPL-MCNC: 204 MG/DL — HIGH (ref 70–99)
HCT VFR BLD CALC: 40 % — SIGNIFICANT CHANGE UP (ref 39–50)
HGB BLD-MCNC: 12.8 G/DL — LOW (ref 13–17)
IMM GRANULOCYTES NFR BLD AUTO: 0.9 % — SIGNIFICANT CHANGE UP (ref 0–1.5)
INR BLD: 1.12 — SIGNIFICANT CHANGE UP (ref 0.88–1.16)
LYMPHOCYTES # BLD AUTO: 1.85 K/UL — SIGNIFICANT CHANGE UP (ref 1–3.3)
LYMPHOCYTES # BLD AUTO: 24.2 % — SIGNIFICANT CHANGE UP (ref 13–44)
MCHC RBC-ENTMCNC: 27.6 PG — SIGNIFICANT CHANGE UP (ref 27–34)
MCHC RBC-ENTMCNC: 32 GM/DL — SIGNIFICANT CHANGE UP (ref 32–36)
MCV RBC AUTO: 86.2 FL — SIGNIFICANT CHANGE UP (ref 80–100)
MONOCYTES # BLD AUTO: 0.73 K/UL — SIGNIFICANT CHANGE UP (ref 0–0.9)
MONOCYTES NFR BLD AUTO: 9.5 % — SIGNIFICANT CHANGE UP (ref 2–14)
NEUTROPHILS # BLD AUTO: 4.68 K/UL — SIGNIFICANT CHANGE UP (ref 1.8–7.4)
NEUTROPHILS NFR BLD AUTO: 61.2 % — SIGNIFICANT CHANGE UP (ref 43–77)
NRBC # BLD: 0 /100 WBCS — SIGNIFICANT CHANGE UP (ref 0–0)
PLATELET # BLD AUTO: 227 K/UL — SIGNIFICANT CHANGE UP (ref 150–400)
POTASSIUM SERPL-MCNC: 3.9 MMOL/L — SIGNIFICANT CHANGE UP (ref 3.5–5.3)
POTASSIUM SERPL-MCNC: SIGNIFICANT CHANGE UP (ref 3.5–5.3)
POTASSIUM SERPL-SCNC: 3.9 MMOL/L — SIGNIFICANT CHANGE UP (ref 3.5–5.3)
POTASSIUM SERPL-SCNC: SIGNIFICANT CHANGE UP (ref 3.5–5.3)
PROT SERPL-MCNC: 7.8 G/DL — SIGNIFICANT CHANGE UP (ref 6–8.3)
PROT SERPL-MCNC: 8.2 G/DL — SIGNIFICANT CHANGE UP (ref 6–8.3)
PROTHROM AB SERPL-ACNC: 13.4 SEC — SIGNIFICANT CHANGE UP (ref 10.6–13.6)
RBC # BLD: 4.64 M/UL — SIGNIFICANT CHANGE UP (ref 4.2–5.8)
RBC # FLD: 13.7 % — SIGNIFICANT CHANGE UP (ref 10.3–14.5)
SARS-COV-2 RNA SPEC QL NAA+PROBE: SIGNIFICANT CHANGE UP
SODIUM SERPL-SCNC: 139 MMOL/L — SIGNIFICANT CHANGE UP (ref 135–145)
SODIUM SERPL-SCNC: 142 MMOL/L — SIGNIFICANT CHANGE UP (ref 135–145)
TROPONIN T SERPL-MCNC: <0.01 NG/ML — SIGNIFICANT CHANGE UP (ref 0–0.01)
WBC # BLD: 7.65 K/UL — SIGNIFICANT CHANGE UP (ref 3.8–10.5)
WBC # FLD AUTO: 7.65 K/UL — SIGNIFICANT CHANGE UP (ref 3.8–10.5)

## 2020-07-31 PROCEDURE — 99285 EMERGENCY DEPT VISIT HI MDM: CPT | Mod: 25

## 2020-07-31 PROCEDURE — 93010 ELECTROCARDIOGRAM REPORT: CPT

## 2020-07-31 PROCEDURE — 71046 X-RAY EXAM CHEST 2 VIEWS: CPT | Mod: 26

## 2020-07-31 RX ORDER — INSULIN GLARGINE 100 [IU]/ML
20 INJECTION, SOLUTION SUBCUTANEOUS
Qty: 0 | Refills: 0 | DISCHARGE

## 2020-07-31 RX ORDER — ASPIRIN/CALCIUM CARB/MAGNESIUM 324 MG
81 TABLET ORAL DAILY
Refills: 0 | Status: DISCONTINUED | OUTPATIENT
Start: 2020-07-31 | End: 2020-08-05

## 2020-07-31 RX ORDER — NIFEDIPINE 30 MG
60 TABLET, EXTENDED RELEASE 24 HR ORAL DAILY
Refills: 0 | Status: DISCONTINUED | OUTPATIENT
Start: 2020-07-31 | End: 2020-08-05

## 2020-07-31 RX ORDER — ATORVASTATIN CALCIUM 80 MG/1
40 TABLET, FILM COATED ORAL AT BEDTIME
Refills: 0 | Status: DISCONTINUED | OUTPATIENT
Start: 2020-07-31 | End: 2020-08-05

## 2020-07-31 RX ORDER — ASPIRIN/CALCIUM CARB/MAGNESIUM 324 MG
162 TABLET ORAL ONCE
Refills: 0 | Status: COMPLETED | OUTPATIENT
Start: 2020-07-31 | End: 2020-07-31

## 2020-07-31 RX ORDER — METOPROLOL TARTRATE 50 MG
1 TABLET ORAL
Qty: 0 | Refills: 0 | DISCHARGE

## 2020-07-31 RX ORDER — METOPROLOL TARTRATE 50 MG
50 TABLET ORAL DAILY
Refills: 0 | Status: DISCONTINUED | OUTPATIENT
Start: 2020-07-31 | End: 2020-08-05

## 2020-07-31 RX ORDER — ATORVASTATIN CALCIUM 80 MG/1
1 TABLET, FILM COATED ORAL
Qty: 0 | Refills: 0 | DISCHARGE

## 2020-07-31 RX ORDER — HYDROCHLOROTHIAZIDE 25 MG
25 TABLET ORAL DAILY
Refills: 0 | Status: DISCONTINUED | OUTPATIENT
Start: 2020-07-31 | End: 2020-08-05

## 2020-07-31 RX ORDER — NIFEDIPINE 30 MG
1 TABLET, EXTENDED RELEASE 24 HR ORAL
Qty: 0 | Refills: 0 | DISCHARGE

## 2020-07-31 RX ORDER — KETOROLAC TROMETHAMINE 30 MG/ML
15 SYRINGE (ML) INJECTION ONCE
Refills: 0 | Status: DISCONTINUED | OUTPATIENT
Start: 2020-07-31 | End: 2020-07-31

## 2020-07-31 RX ORDER — ENALAPRIL MALEATE AND HYDROCHLOROTHIAZIDE 10; 25 MG/1; MG/1
1 TABLET ORAL
Qty: 0 | Refills: 0 | DISCHARGE

## 2020-07-31 RX ORDER — ASPIRIN/CALCIUM CARB/MAGNESIUM 324 MG
1 TABLET ORAL
Qty: 0 | Refills: 0 | DISCHARGE

## 2020-07-31 RX ADMIN — Medication 15 MILLIGRAM(S): at 22:57

## 2020-07-31 RX ADMIN — Medication 162 MILLIGRAM(S): at 22:23
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
## 2020-07-31 NOTE — ED ADULT NURSE NOTE - CHPI ED NUR SYMPTOMS NEG
no chills/no nausea/no syncope/no dizziness/no shortness of breath/no back pain/no vomiting/no fever/no congestion/no diaphoresis

## 2020-07-31 NOTE — ED PROVIDER NOTE - PHYSICAL EXAMINATION
CONSTITUTIONAL: Well-appearing; well-nourished; in no apparent distress.   HEAD: Normocephalic; atraumatic.   EYES: PERRL; EOM intact; conjunctiva and sclera clear  ENT: normal nose; no rhinorrhea; normal pharynx with no erythema or lesions.   NECK: Supple; non-tender; no LAD  CARDIOVASCULAR: Normal S1, S2; no murmurs, rubs, or gallops. Regular rate and rhythm. midsternal healed scar   RESPIRATORY: Breathing easily; breath sounds clear and equal bilaterally; no wheezes, rhonchi, or rales.  GI: Soft; non-distended; non-tender; no palpable organomegaly.   MSK: FROM at all extremities, normal tone   EXT: b/l BKA   SKIN: Normal for age and race; warm; dry; good turgor; no apparent lesions or rash.   NEURO: A & O x 3; face symmetric; grossly unremarkable.   PSYCHOLOGICAL: The patient’s mood and manner are appropriate.
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
## 2020-07-31 NOTE — H&P ADULT - NSICDXPASTSURGICALHX_GEN_ALL_CORE_FT
PAST SURGICAL HISTORY:  S/P BKA (below knee amputation)     S/P CABG (coronary artery bypass graft) PAST SURGICAL HISTORY:  Benign brain tumor s/p excision    S/P BKA (below knee amputation)     S/P CABG (coronary artery bypass graft)     S/P cervical discectomy

## 2020-07-31 NOTE — H&P ADULT - NSICDXPASTMEDICALHX_GEN_ALL_CORE_FT
PAST MEDICAL HISTORY:  CAD (coronary artery disease)     Diabetes     HTN (hypertension)     Hyperlipidemia
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
## 2020-07-31 NOTE — ED PROVIDER NOTE - DIAGNOSTIC INTERPRETATION
ER PA: Jamia Duke, PAC  CHEST XRAY INTERPRETATION: lungs clear, heart shadow normal, bony structures intact

## 2020-07-31 NOTE — H&P ADULT - PROBLEM SELECTOR PLAN 1
currently chest pain free, EKG NSR@93BPM with no acute ST/T wave changes. CXR unremarkable. Labs notable for: cardiac enzymes negative x 1 set, COVID PCR pending.  --will F/U cardiac enzymes@1AM and 5AM.  --obtain Echocardiogram in AM and discuss further ischemic eval as patient has not been seen by a cardiologist in >2 yrs.
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
## 2020-08-01 DIAGNOSIS — I25.10 ATHEROSCLEROTIC HEART DISEASE OF NATIVE CORONARY ARTERY WITHOUT ANGINA PECTORIS: ICD-10-CM

## 2020-08-01 DIAGNOSIS — I10 ESSENTIAL (PRIMARY) HYPERTENSION: ICD-10-CM

## 2020-08-01 DIAGNOSIS — E11.9 TYPE 2 DIABETES MELLITUS WITHOUT COMPLICATIONS: ICD-10-CM

## 2020-08-01 DIAGNOSIS — R07.9 CHEST PAIN, UNSPECIFIED: ICD-10-CM

## 2020-08-01 LAB
A1C WITH ESTIMATED AVERAGE GLUCOSE RESULT: 6.9 % — HIGH (ref 4–5.6)
ANION GAP SERPL CALC-SCNC: 10 MMOL/L — SIGNIFICANT CHANGE UP (ref 5–17)
BUN SERPL-MCNC: 24 MG/DL — HIGH (ref 7–23)
CALCIUM SERPL-MCNC: 9.2 MG/DL — SIGNIFICANT CHANGE UP (ref 8.4–10.5)
CHLORIDE SERPL-SCNC: 105 MMOL/L — SIGNIFICANT CHANGE UP (ref 96–108)
CHOLEST SERPL-MCNC: 123 MG/DL — SIGNIFICANT CHANGE UP (ref 10–199)
CK MB CFR SERPL CALC: 2.8 NG/ML — SIGNIFICANT CHANGE UP (ref 0–6.7)
CK MB CFR SERPL CALC: 2.8 NG/ML — SIGNIFICANT CHANGE UP (ref 0–6.7)
CK SERPL-CCNC: 119 U/L — SIGNIFICANT CHANGE UP (ref 30–200)
CK SERPL-CCNC: 141 U/L — SIGNIFICANT CHANGE UP (ref 30–200)
CO2 SERPL-SCNC: 28 MMOL/L — SIGNIFICANT CHANGE UP (ref 22–31)
CREAT SERPL-MCNC: 0.96 MG/DL — SIGNIFICANT CHANGE UP (ref 0.5–1.3)
ESTIMATED AVERAGE GLUCOSE: 151 MG/DL — HIGH (ref 68–114)
GLUCOSE BLDC GLUCOMTR-MCNC: 114 MG/DL — HIGH (ref 70–99)
GLUCOSE BLDC GLUCOMTR-MCNC: 125 MG/DL — HIGH (ref 70–99)
GLUCOSE BLDC GLUCOMTR-MCNC: 162 MG/DL — HIGH (ref 70–99)
GLUCOSE BLDC GLUCOMTR-MCNC: 167 MG/DL — HIGH (ref 70–99)
GLUCOSE SERPL-MCNC: 116 MG/DL — HIGH (ref 70–99)
HCT VFR BLD CALC: 38.6 % — LOW (ref 39–50)
HDLC SERPL-MCNC: 34 MG/DL — LOW
HGB BLD-MCNC: 12.3 G/DL — LOW (ref 13–17)
LIPID PNL WITH DIRECT LDL SERPL: 63 MG/DL — SIGNIFICANT CHANGE UP
MAGNESIUM SERPL-MCNC: 2.3 MG/DL — SIGNIFICANT CHANGE UP (ref 1.6–2.6)
MCHC RBC-ENTMCNC: 28.1 PG — SIGNIFICANT CHANGE UP (ref 27–34)
MCHC RBC-ENTMCNC: 31.9 GM/DL — LOW (ref 32–36)
MCV RBC AUTO: 88.1 FL — SIGNIFICANT CHANGE UP (ref 80–100)
NRBC # BLD: 0 /100 WBCS — SIGNIFICANT CHANGE UP (ref 0–0)
PLATELET # BLD AUTO: 202 K/UL — SIGNIFICANT CHANGE UP (ref 150–400)
POTASSIUM SERPL-MCNC: 3.8 MMOL/L — SIGNIFICANT CHANGE UP (ref 3.5–5.3)
POTASSIUM SERPL-SCNC: 3.8 MMOL/L — SIGNIFICANT CHANGE UP (ref 3.5–5.3)
RBC # BLD: 4.38 M/UL — SIGNIFICANT CHANGE UP (ref 4.2–5.8)
RBC # FLD: 13.5 % — SIGNIFICANT CHANGE UP (ref 10.3–14.5)
SODIUM SERPL-SCNC: 143 MMOL/L — SIGNIFICANT CHANGE UP (ref 135–145)
TOTAL CHOLESTEROL/HDL RATIO MEASUREMENT: 3.6 RATIO — SIGNIFICANT CHANGE UP (ref 3.4–9.6)
TRIGL SERPL-MCNC: 131 MG/DL — SIGNIFICANT CHANGE UP (ref 10–149)
TROPONIN T SERPL-MCNC: 0.01 NG/ML — SIGNIFICANT CHANGE UP (ref 0–0.01)
TROPONIN T SERPL-MCNC: 0.01 NG/ML — SIGNIFICANT CHANGE UP (ref 0–0.01)
WBC # BLD: 6.78 K/UL — SIGNIFICANT CHANGE UP (ref 3.8–10.5)
WBC # FLD AUTO: 6.78 K/UL — SIGNIFICANT CHANGE UP (ref 3.8–10.5)

## 2020-08-01 PROCEDURE — 93306 TTE W/DOPPLER COMPLETE: CPT | Mod: 26

## 2020-08-01 PROCEDURE — 99223 1ST HOSP IP/OBS HIGH 75: CPT

## 2020-08-01 RX ORDER — DEXTROSE 50 % IN WATER 50 %
25 SYRINGE (ML) INTRAVENOUS ONCE
Refills: 0 | Status: DISCONTINUED | OUTPATIENT
Start: 2020-08-01 | End: 2020-08-05

## 2020-08-01 RX ORDER — ENOXAPARIN SODIUM 100 MG/ML
40 INJECTION SUBCUTANEOUS DAILY
Refills: 0 | Status: DISCONTINUED | OUTPATIENT
Start: 2020-08-01 | End: 2020-08-05

## 2020-08-01 RX ORDER — INSULIN LISPRO 100/ML
VIAL (ML) SUBCUTANEOUS
Refills: 0 | Status: DISCONTINUED | OUTPATIENT
Start: 2020-08-01 | End: 2020-08-05

## 2020-08-01 RX ORDER — INSULIN GLARGINE 100 [IU]/ML
20 INJECTION, SOLUTION SUBCUTANEOUS AT BEDTIME
Refills: 0 | Status: DISCONTINUED | OUTPATIENT
Start: 2020-08-01 | End: 2020-08-05

## 2020-08-01 RX ORDER — SODIUM CHLORIDE 9 MG/ML
1000 INJECTION, SOLUTION INTRAVENOUS
Refills: 0 | Status: DISCONTINUED | OUTPATIENT
Start: 2020-08-01 | End: 2020-08-05

## 2020-08-01 RX ORDER — ACETAMINOPHEN 500 MG
650 TABLET ORAL EVERY 6 HOURS
Refills: 0 | Status: DISCONTINUED | OUTPATIENT
Start: 2020-08-01 | End: 2020-08-05

## 2020-08-01 RX ORDER — DEXTROSE 50 % IN WATER 50 %
12.5 SYRINGE (ML) INTRAVENOUS ONCE
Refills: 0 | Status: DISCONTINUED | OUTPATIENT
Start: 2020-08-01 | End: 2020-08-05

## 2020-08-01 RX ORDER — DEXTROSE 50 % IN WATER 50 %
15 SYRINGE (ML) INTRAVENOUS ONCE
Refills: 0 | Status: DISCONTINUED | OUTPATIENT
Start: 2020-08-01 | End: 2020-08-05

## 2020-08-01 RX ORDER — POTASSIUM CHLORIDE 20 MEQ
30 PACKET (EA) ORAL ONCE
Refills: 0 | Status: COMPLETED | OUTPATIENT
Start: 2020-08-01 | End: 2020-08-01

## 2020-08-01 RX ORDER — GLUCAGON INJECTION, SOLUTION 0.5 MG/.1ML
1 INJECTION, SOLUTION SUBCUTANEOUS ONCE
Refills: 0 | Status: DISCONTINUED | OUTPATIENT
Start: 2020-08-01 | End: 2020-08-05

## 2020-08-01 RX ADMIN — Medication 60 MILLIGRAM(S): at 09:29

## 2020-08-01 RX ADMIN — Medication 25 MILLIGRAM(S): at 07:45

## 2020-08-01 RX ADMIN — Medication 2: at 16:57

## 2020-08-01 RX ADMIN — Medication 30 MILLIEQUIVALENT(S): at 09:29

## 2020-08-01 RX ADMIN — Medication 650 MILLIGRAM(S): at 10:07

## 2020-08-01 RX ADMIN — Medication 650 MILLIGRAM(S): at 09:29

## 2020-08-01 RX ADMIN — Medication 15 MILLIGRAM(S): at 00:23

## 2020-08-01 RX ADMIN — Medication 10 MILLIGRAM(S): at 05:40

## 2020-08-01 RX ADMIN — ENOXAPARIN SODIUM 40 MILLIGRAM(S): 100 INJECTION SUBCUTANEOUS at 11:42

## 2020-08-01 RX ADMIN — INSULIN GLARGINE 20 UNIT(S): 100 INJECTION, SOLUTION SUBCUTANEOUS at 22:40

## 2020-08-01 RX ADMIN — Medication 50 MILLIGRAM(S): at 05:41

## 2020-08-01 RX ADMIN — ATORVASTATIN CALCIUM 40 MILLIGRAM(S): 80 TABLET, FILM COATED ORAL at 23:04

## 2020-08-01 RX ADMIN — Medication 81 MILLIGRAM(S): at 11:42

## 2020-08-01 NOTE — PROGRESS NOTE ADULT - PROBLEM SELECTOR PLAN 4
continue FS's, Lantus 20units subQ q hs and ICS PRN    VTE PPx: Lovenox  Dispo: d/c in AM, needs      Case d/w Dr. Ruiz

## 2020-08-01 NOTE — ED ADULT NURSE REASSESSMENT NOTE - NS ED NURSE REASSESS COMMENT FT1
Attempted to call report, pt unable to accept pt at this time. [Subsequent Evaluation] : a subsequent evaluation for [FreeTextEntry2] : sinusitis.

## 2020-08-01 NOTE — PATIENT PROFILE ADULT - SURGICAL SITE INCISION
no Odomzo Counseling- I discussed with the patient the risks of Odomzo including but not limited to nausea, vomiting, diarrhea, constipation, weight loss, changes in the sense of taste, decreased appetite, muscle spasms, and hair loss. The patient verbalized understanding of the proper use and possible adverse effects of Odomzo. All of the patient's questions and concerns were addressed. Metronidazole Counseling:  I discussed with the patient the risks of metronidazole including but not limited to seizures, nausea/vomiting, a metallic taste in the mouth, nausea/vomiting and severe allergy. Elidel Pregnancy And Lactation Text: This medication is Pregnancy Category C. It is unknown if this medication is excreted in breast milk. Detail Level: Simple Terbinafine Counseling: Patient counseling regarding adverse effects of terbinafine including but not limited to headache, diarrhea, rash, upset stomach, liver function test abnormalities, itching, taste/smell disturbance, nausea, abdominal pain, and flatulence. There is a rare possibility of liver failure that can occur when taking terbinafine. The patient understands that a baseline LFT and kidney function test may be required. The patient verbalized understanding of the proper use and possible adverse effects of terbinafine. All of the patient's questions and concerns were addressed. Terbinafine Pregnancy And Lactation Text: This medication is Pregnancy Category B and is considered safe during pregnancy. It is also excreted in breast milk and breast feeding isn't recommended. Cosentyx Counseling:  I discussed with the patient the risks of Cosentyx including but not limited to worsening of Crohn's disease, immunosuppression, allergic reactions and infections. The patient understands that monitoring is required including a PPD at baseline and must alert us or the primary physician if symptoms of infection or other concerning signs are noted. Clofazimine Pregnancy And Lactation Text: This medication is Pregnancy Category C and isn't considered safe during pregnancy. It is excreted in breast milk. Topical Retinoid counseling:  Patient advised to apply a pea-sized amount only at bedtime and wait 30 minutes after washing their face before applying. If too drying, patient may add a non-comedogenic moisturizer. The patient verbalized understanding of the proper use and possible adverse effects of retinoids. All of the patient's questions and concerns were addressed. Tremfya Counseling: I discussed with the patient the risks of guselkumab including but not limited to immunosuppression, serious infections, worsening of inflammatory bowel disease and drug reactions. The patient understands that monitoring is required including a PPD at baseline and must alert us or the primary physician if symptoms of infection or other concerning signs are noted. Valtrex Pregnancy And Lactation Text: this medication is Pregnancy Category B and is considered safe during pregnancy. This medication is not directly found in breast milk but it's metabolite acyclovir is present. Colchicine Counseling:  Patient counseled regarding adverse effects including but not limited to stomach upset (nausea, vomiting, stomach pain, or diarrhea). Patient instructed to limit alcohol consumption while taking this medication. Colchicine may reduce blood counts especially with prolonged use. The patient understands that monitoring of kidney function and blood counts may be required, especially at baseline. The patient verbalized understanding of the proper use and possible adverse effects of colchicine. All of the patient's questions and concerns were addressed. Use Enhanced Medication Counseling?: No Tremfya Pregnancy And Lactation Text: The risk during pregnancy and breastfeeding is uncertain with this medication. Metronidazole Pregnancy And Lactation Text: This medication is Pregnancy Category B and considered safe during pregnancy. It is also excreted in breast milk. Odomzo Pregnancy And Lactation Text: This medication is Pregnancy Category X and is absolutely contraindicated during pregnancy. It is unknown if it is excreted in breast milk. Eucrisa Counseling: Patient may experience a mild burning sensation during topical application. [place] Sleet is not approved in children less than 3years of age. Dupixent Counseling: I discussed with the patient the risks of dupilumab including but not limited to eye infection and irritation, cold sores, injection site reactions, worsening of asthma, allergic reactions and increased risk of parasitic infection. Live vaccines should be avoided while taking dupilumab. Dupilumab will also interact with certain medications such as warfarin and cyclosporine. The patient understands that monitoring is required and they must alert us or the primary physician if symptoms of infection or other concerning signs are noted. Cosentyx Pregnancy And Lactation Text: This medication is Pregnancy Category B and is considered safe during pregnancy. It is unknown if this medication is excreted in breast milk. Acitretin Counseling:  I discussed with the patient the risks of acitretin including but not limited to hair loss, dry lips/skin/eyes, liver damage, hyperlipidemia, depression/suicidal ideation, photosensitivity. Serious rare side effects can include but are not limited to pancreatitis, pseudotumor cerebri, bony changes, clot formation/stroke/heart attack. Patient understands that alcohol is contraindicated since it can result in liver toxicity and significantly prolong the elimination of the drug by many years. Acitretin Pregnancy And Lactation Text: This medication is Pregnancy Category X and should not be given to women who are pregnant or may become pregnant in the future. This medication is excreted in breast milk. Dupixent Pregnancy And Lactation Text: This medication likely crosses the placenta but the risk for the fetus is uncertain. This medication is excreted in breast milk. Eucrisa Pregnancy And Lactation Text: This medication has not been assigned a Pregnancy Risk Category but animal studies failed to show danger with the topical medication. It is unknown if the medication is excreted in breast milk. Xeljanz Counseling: Nelda Hurtado Counseling: I discussed with the patient the risks of Nelda Grate therapy including increased risk of infection, liver issues, headache, diarrhea, or cold symptoms. Live vaccines should be avoided. They were instructed to call if they have any problems. Otezla Counseling: Yisel Newness Counseling: The side effects of Yisel Newness were discussed with the patient, including but not limited to worsening or new depression, weight loss, diarrhea, nausea, upper respiratory tract infection, and headache. Patient instructed to call the office should any adverse effect occur. The patient verbalized understanding of the proper use and possible adverse effects of Otezla. All the patient's questions and concerns were addressed. Minocycline Counseling: Patient advised regarding possible photosensitivity and discoloration of the teeth, skin, lips, tongue and gums. Patient instructed to avoid sunlight, if possible. When exposed to sunlight, patients should wear protective clothing, sunglasses, and sunscreen. The patient was instructed to call the office immediately if the following severe adverse effects occur:  hearing changes, easy bruising/bleeding, severe headache, or vision changes. The patient verbalized understanding of the proper use and possible adverse effects of minocycline. All of the patient's questions and concerns were addressed. Tazorac Counseling:  Patient advised that medication is irritating and drying. Patient may need to apply sparingly and wash off after an hour before eventually leaving it on overnight. The patient verbalized understanding of the proper use and possible adverse effects of tazorac. All of the patient's questions and concerns were addressed. Otezla Pregnancy And Lactation Text: This medication is Pregnancy Category C and it isn't known if it is safe during pregnancy. It is unknown if it is excreted in breast milk. Minocycline Pregnancy And Lactation Text: This medication is Pregnancy Category D and not consider safe during pregnancy. It is also excreted in breast milk. Hydroquinone Counseling:  Patient advised that medication may result in skin irritation, lightening (hypopigmentation), dryness, and burning. In the event of skin irritation, the patient was advised to reduce the amount of the drug applied or use it less frequently. Rarely, spots that are treated with hydroquinone can become darker (pseudoochronosis). Should this occur, patient instructed to stop medication and call the office. The patient verbalized understanding of the proper use and possible adverse effects of hydroquinone. All of the patient's questions and concerns were addressed. Cimetidine Counseling:  I discussed with the patient the risks of Cimetidine including but not limited to gynecomastia, headache, diarrhea, nausea, drowsiness, arrhythmias, pancreatitis, skin rashes, psychosis, bone marrow suppression and kidney toxicity. Enbrel Counseling:  I discussed with the patient the risks of etanercept including but not limited to myelosuppression, immunosuppression, autoimmune hepatitis, demyelinating diseases, lymphoma, and infections. The patient understands that monitoring is required including a PPD at baseline and must alert us or the primary physician if symptoms of infection or other concerning signs are noted. Dapsone Counseling: I discussed with the patient the risks of dapsone including but not limited to hemolytic anemia, agranulocytosis, rashes, methemoglobinemia, kidney failure, peripheral neuropathy, headaches, GI upset, and liver toxicity. Patients who start dapsone require monitoring including baseline LFTs and weekly CBCs for the first month, then every month thereafter. The patient verbalized understanding of the proper use and possible adverse effects of dapsone. All of the patient's questions and concerns were addressed. Tazorac Pregnancy And Lactation Text: This medication is not safe during pregnancy. It is unknown if this medication is excreted in breast milk. Xelalekz Pregnancy And Lactation Text: This medication is Pregnancy Category D and is not considered safe during pregnancy. The risk during breast feeding is also uncertain. Bexarotene Counseling:  I discussed with the patient the risks of bexarotene including but not limited to hair loss, dry lips/skin/eyes, liver abnormalities, hyperlipidemia, pancreatitis, depression/suicidal ideation, photosensitivity, drug rash/allergic reactions, hypothyroidism, anemia, leukopenia, infection, cataracts, and teratogenicity. Patient understands that they will need regular blood tests to check lipid profile, liver function tests, white blood cell count, thyroid function tests and pregnancy test if applicable. Xolair Counseling:  Patient informed of potential adverse effects including but not limited to fever, muscle aches, rash and allergic reactions. The patient verbalized understanding of the proper use and possible adverse effects of Xolair. All of the patient's questions and concerns were addressed. Dapsone Pregnancy And Lactation Text: This medication is Pregnancy Category C and is not considered safe during pregnancy or breast feeding. Bexarotene Pregnancy And Lactation Text: This medication is Pregnancy Category X and should not be given to women who are pregnant or may become pregnant. This medication should not be used if you are breast feeding. Topical Clindamycin Counseling: Patient counseled that this medication may cause skin irritation or allergic reactions. In the event of skin irritation, the patient was advised to reduce the amount of the drug applied or use it less frequently. The patient verbalized understanding of the proper use and possible adverse effects of clindamycin. All of the patient's questions and concerns were addressed. Quinolones Counseling:  I discussed with the patient the risks of fluoroquinolones including but not limited to GI upset, allergic reaction, drug rash, diarrhea, dizziness, photosensitivity, yeast infections, liver function test abnormalities, tendonitis/tendon rupture. Oxybutynin Counseling:  I discussed with the patient the risks of oxybutynin including but not limited to skin rash, drowsiness, dry mouth, difficulty urinating, and blurred vision. Isotretinoin Counseling: Patient should get monthly blood tests, not donate blood, not drive at night if vision affected, not share medication, and not undergo elective surgery for 6 months after tx completed. Side effects reviewed, pt to contact office should one occur. Erivedge Counseling- I discussed with the patient the risks of Erivedge including but not limited to nausea, vomiting, diarrhea, constipation, weight loss, changes in the sense of taste, decreased appetite, muscle spasms, and hair loss. The patient verbalized understanding of the proper use and possible adverse effects of Erivedge. All of the patient's questions and concerns were addressed. Doxepin Counseling:  Patient advised that the medication is sedating and not to drive a car after taking this medication. Patient informed of potential adverse effects including but not limited to dry mouth, urinary retention, and blurry vision. The patient verbalized understanding of the proper use and possible adverse effects of doxepin. All of the patient's questions and concerns were addressed. Topical Clindamycin Pregnancy And Lactation Text: This medication is Pregnancy Category B and is considered safe during pregnancy. It is unknown if it is excreted in breast milk. Imiquimod Counseling:  I discussed with the patient the risks of imiquimod including but not limited to erythema, scaling, itching, weeping, crusting, and pain. Patient understands that the inflammatory response to imiquimod is variable from person to person and was educated regarded proper titration schedule. If flu-like symptoms develop, patient knows to discontinue the medication and contact us. Xolair Pregnancy And Lactation Text: This medication is Pregnancy Category B and is considered safe during pregnancy. This medication is excreted in breast milk. Quinolones Pregnancy And Lactation Text: This medication is Pregnancy Category C and it isn't know if it is safe during pregnancy. It is also excreted in breast milk. Rifampin Counseling: I discussed with the patient the risks of rifampin including but not limited to liver damage, kidney damage, red-orange body fluids, nausea/vomiting and severe allergy. Propranolol Counseling:  I discussed with the patient the risks of propranolol including but not limited to low heart rate, low blood pressure, low blood sugar, restlessness and increased cold sensitivity. They should call the office if they experience any of these side effects. Humira Counseling:  I discussed with the patient the risks of adalimumab including but not limited to myelosuppression, immunosuppression, autoimmune hepatitis, demyelinating diseases, lymphoma, and serious infections. The patient understands that monitoring is required including a PPD at baseline and must alert us or the primary physician if symptoms of infection or other concerning signs are noted. Topical Sulfur Applications Counseling: Topical Sulfur Counseling: Patient counseled that this medication may cause skin irritation or allergic reactions. In the event of skin irritation, the patient was advised to reduce the amount of the drug applied or use it less frequently. The patient verbalized understanding of the proper use and possible adverse effects of topical sulfur application. All of the patient's questions and concerns were addressed. Doxepin Pregnancy And Lactation Text: This medication is Pregnancy Category C and it isn't known if it is safe during pregnancy. It is also excreted in breast milk and breast feeding isn't recommended. Isotretinoin Pregnancy And Lactation Text: This medication is Pregnancy Category X and is considered extremely dangerous during pregnancy. It is unknown if it is excreted in breast milk. Azathioprine Counseling:  I discussed with the patient the risks of azathioprine including but not limited to myelosuppression, immunosuppression, hepatotoxicity, lymphoma, and infections. The patient understands that monitoring is required including baseline LFTs, Creatinine, possible TPMP genotyping and weekly CBCs for the first month and then every 2 weeks thereafter. The patient verbalized understanding of the proper use and possible adverse effects of azathioprine. All of the patient's questions and concerns were addressed. Finasteride Counseling:  I discussed with the patient the risks of use of finasteride including but not limited to decreased libido, decreased ejaculate volume, gynecomastia, and depression. Women should not handle medication. All of the patient's questions and concerns were addressed. High Dose Vitamin A Counseling: Side effects reviewed, pt to contact office should one occur. Hydroxyzine Counseling: Patient advised that the medication is sedating and not to drive a car after taking this medication. Patient informed of potential adverse effects including but not limited to dry mouth, urinary retention, and blurry vision. The patient verbalized understanding of the proper use and possible adverse effects of hydroxyzine. All of the patient's questions and concerns were addressed. Topical Sulfur Applications Pregnancy And Lactation Text: This medication is Pregnancy Category C and has an unknown safety profile during pregnancy. It is unknown if this topical medication is excreted in breast milk. Rifampin Pregnancy And Lactation Text: This medication is Pregnancy Category C and it isn't know if it is safe during pregnancy. It is also excreted in breast milk and should not be used if you are breast feeding. Propranolol Pregnancy And Lactation Text: This medication is Pregnancy Category C and it isn't known if it is safe during pregnancy. It is excreted in breast milk. Minoxidil Counseling: Minoxidil is a topical medication which can increase blood flow where it is applied. It is uncertain how this medication increases hair growth. Side effects are uncommon and include stinging and allergic reactions. Azithromycin Counseling:  I discussed with the patient the risks of azithromycin including but not limited to GI upset, allergic reaction, drug rash, diarrhea, and yeast infections. Azithromycin Pregnancy And Lactation Text: This medication is considered safe during pregnancy and is also secreted in breast milk. High Dose Vitamin A Pregnancy And Lactation Text: High dose vitamin A therapy is contraindicated during pregnancy and breast feeding. Finasteride Pregnancy And Lactation Text: This medication is absolutely contraindicated during pregnancy. It is unknown if it is excreted in breast milk. Wartpeel Counseling:  I discussed with the patient the risks of Wartpeel including but not limited to erythema, scaling, itching, weeping, crusting, and pain. Hydroxyzine Pregnancy And Lactation Text: This medication is not safe during pregnancy and should not be taken. It is also excreted in breast milk and breast feeding isn't recommended. Birth Control Pills Counseling: Birth Control Pill Counseling: I discussed with the patient the potential side effects of OCPs including but not limited to increased risk of stroke, heart attack, thrombophlebitis, deep venous thrombosis, hepatic adenomas, breast changes, GI upset, headaches, and depression. The patient verbalized understanding of the proper use and possible adverse effects of OCPs. All of the patient's questions and concerns were addressed. Tetracycline Counseling: Patient counseled regarding possible photosensitivity and increased risk for sunburn. Patient instructed to avoid sunlight, if possible. When exposed to sunlight, patients should wear protective clothing, sunglasses, and sunscreen. The patient was instructed to call the office immediately if the following severe adverse effects occur:  hearing changes, easy bruising/bleeding, severe headache, or vision changes. The patient verbalized understanding of the proper use and possible adverse effects of tetracycline. All of the patient's questions and concerns were addressed. Patient understands to avoid pregnancy while on therapy due to potential birth defects. Azathioprine Pregnancy And Lactation Text: This medication is Pregnancy Category D and isn't considered safe during pregnancy. It is unknown if this medication is excreted in breast milk. Wartpeel Pregnancy And Lactation Text: This medication is Pregnancy Category X and contraindicated in pregnancy and in women who may become pregnant. It is unknown if this medication is excreted in breast milk. Birth Control Pills Pregnancy And Lactation Text: This medication should be avoided if pregnant and for the first 30 days post-partum. Bactrim Counseling:  I discussed with the patient the risks of sulfa antibiotics including but not limited to GI upset, allergic reaction, drug rash, diarrhea, dizziness, photosensitivity, and yeast infections. Rarely, more serious reactions can occur including but not limited to aplastic anemia, agranulocytosis, methemoglobinemia, blood dyscrasias, liver or kidney failure, lung infiltrates or desquamative/blistering drug rashes. Ilumya Counseling: I discussed with the patient the risks of tildrakizumab including but not limited to immunosuppression, malignancy, posterior leukoencephalopathy syndrome, and serious infections. The patient understands that monitoring is required including a PPD at baseline and must alert us or the primary physician if symptoms of infection or other concerning signs are noted. Mirvaso Counseling: Patience Fass is a topical medication which can decrease superficial blood flow where applied. Side effects are uncommon and include stinging, redness and allergic reactions. Cellcept Counseling:  I discussed with the patient the risks of mycophenolate mofetil including but not limited to infection/immunosuppression, GI upset, hypokalemia, hypercholesterolemia, bone marrow suppression, lymphoproliferative disorders, malignancy, GI ulceration/bleed/perforation, colitis, interstitial lung disease, kidney failure, progressive multifocal leukoencephalopathy, and birth defects. The patient understands that monitoring is required including a baseline creatinine and regular CBC testing. In addition, patient must alert us immediately if symptoms of infection or other concerning signs are noted. Infliximab Counseling:  I discussed with the patient the risks of infliximab including but not limited to myelosuppression, immunosuppression, autoimmune hepatitis, demyelinating diseases, lymphoma, and serious infections. The patient understands that monitoring is required including a PPD at baseline and must alert us or the primary physician if symptoms of infection or other concerning signs are noted. Gabapentin Counseling: I discussed with the patient the risks of gabapentin including but not limited to dizziness, somnolence, fatigue and ataxia. Albendazole Counseling:  I discussed with the patient the risks of albendazole including but not limited to cytopenia, kidney damage, nausea/vomiting and severe allergy. The patient understands that this medication is being used in an off-label manner. Benzoyl Peroxide Counseling: Patient counseled that medicine may cause skin irritation and bleach clothing. In the event of skin irritation, the patient was advised to reduce the amount of the drug applied or use it less frequently. The patient verbalized understanding of the proper use and possible adverse effects of benzoyl peroxide. All of the patient's questions and concerns were addressed. Zyclara Counseling:  I discussed with the patient the risks of imiquimod including but not limited to erythema, scaling, itching, weeping, crusting, and pain. Patient understands that the inflammatory response to imiquimod is variable from person to person and was educated regarded proper titration schedule. If flu-like symptoms develop, patient knows to discontinue the medication and contact us. Spironolactone Counseling: Patient advised regarding risks of diarrhea, abdominal pain, hyperkalemia, birth defects (for female patients), liver toxicity and renal toxicity. The patient may need blood work to monitor liver and kidney function and potassium levels while on therapy. The patient verbalized understanding of the proper use and possible adverse effects of spironolactone. All of the patient's questions and concerns were addressed. Bactrim Pregnancy And Lactation Text: This medication is Pregnancy Category D and is known to cause fetal risk. It is also excreted in breast milk. Cephalexin Counseling: I counseled the patient regarding use of cephalexin as an antibiotic for prophylactic and/or therapeutic purposes. Cephalexin (commonly prescribed under brand name Keflex) is a cephalosporin antibiotic which is active against numerous classes of bacteria, including most skin bacteria. Side effects may include nausea, diarrhea, gastrointestinal upset, rash, hives, yeast infections, and in rare cases, hepatitis, kidney disease, seizures, fever, confusion, neurologic symptoms, and others. Patients with severe allergies to penicillin medications are cautioned that there is about a 10% incidence of cross-reactivity with cephalosporins. When possible, patients with penicillin allergies should use alternatives to cephalosporins for antibiotic therapy. Mirvaso Pregnancy And Lactation Text: This medication has not been assigned a Pregnancy Risk Category. It is unknown if the medication is excreted in breast milk. Spironolactone Pregnancy And Lactation Text: This medication can cause feminization of the male fetus and should be avoided during pregnancy. The active metabolite is also found in breast milk. Benzoyl Peroxide Pregnancy And Lactation Text: This medication is Pregnancy Category C. It is unknown if benzoyl peroxide is excreted in breast milk. Glycopyrrolate Counseling:  I discussed with the patient the risks of glycopyrrolate including but not limited to skin rash, drowsiness, dry mouth, difficulty urinating, and blurred vision. Picato Counseling:  I discussed with the patient the risks of Picato including but not limited to erythema, scaling, itching, weeping, crusting, and pain. Albendazole Pregnancy And Lactation Text: This medication is Pregnancy Category C and it isn't known if it is safe during pregnancy. It is also excreted in breast milk. Fluconazole Counseling:  Patient counseled regarding adverse effects of fluconazole including but not limited to headache, diarrhea, nausea, upset stomach, liver function test abnormalities, taste disturbance, and stomach pain. There is a rare possibility of liver failure that can occur when taking fluconazole. The patient understands that monitoring of LFTs and kidney function test may be required, especially at baseline. The patient verbalized understanding of the proper use and possible adverse effects of fluconazole. All of the patient's questions and concerns were addressed. Cyclophosphamide Counseling:  I discussed with the patient the risks of cyclophosphamide including but not limited to hair loss, hormonal abnormalities, decreased fertility, abdominal pain, diarrhea, nausea and vomiting, bone marrow suppression and infection. The patient understands that monitoring is required while taking this medication. Ivermectin Counseling:  Patient instructed to take medication on an empty stomach with a full glass of water. Patient informed of potential adverse effects including but not limited to nausea, diarrhea, dizziness, itching, and swelling of the extremities or lymph nodes. The patient verbalized understanding of the proper use and possible adverse effects of ivermectin. All of the patient's questions and concerns were addressed. Rituxan Counseling:  I discussed with the patient the risks of Rituxan infusions. Side effects can include infusion reactions, severe drug rashes including mucocutaneous reactions, reactivation of latent hepatitis and other infections and rarely progressive multifocal leukoencephalopathy. All of the patient's questions and concerns were addressed. Cyclophosphamide Pregnancy And Lactation Text: This medication is Pregnancy Category D and it isn't considered safe during pregnancy. This medication is excreted in breast milk. Cephalexin Pregnancy And Lactation Text: This medication is Pregnancy Category B and considered safe during pregnancy. It is also excreted in breast milk but can be used safely for shorter doses. Carac Counseling:  I discussed with the patient the risks of Carac including but not limited to erythema, scaling, itching, weeping, crusting, and pain. SSKI Counseling:  I discussed with the patient the risks of SSKI including but not limited to thyroid abnormalities, metallic taste, GI upset, fever, headache, acne, arthralgias, paraesthesias, lymphadenopathy, easy bleeding, arrhythmias, and allergic reaction. Glycopyrrolate Pregnancy And Lactation Text: This medication is Pregnancy Category B and is considered safe during pregnancy. It is unknown if it is excreted breast milk. Cyclosporine Counseling:  I discussed with the patient the risks of cyclosporine including but not limited to hypertension, gingival hyperplasia,myelosuppression, immunosuppression, liver damage, kidney damage, neurotoxicity, lymphoma, and serious infections. The patient understands that monitoring is required including baseline blood pressure, CBC, CMP, lipid panel and uric acid, and then 1-2 times monthly CMP and blood pressure. Hydroxychloroquine Counseling:  I discussed with the patient that a baseline ophthalmologic exam is needed at the start of therapy and every year thereafter while on therapy. A CBC may also be warranted for monitoring. The side effects of this medication were discussed with the patient, including but not limited to agranulocytosis, aplastic anemia, seizures, rashes, retinopathy, and liver toxicity. Patient instructed to call the office should any adverse effect occur. The patient verbalized understanding of the proper use and possible adverse effects of Plaquenil. All the patient's questions and concerns were addressed. Opioid Counseling: I discussed with the patient the potential side effects of opioids including but not limited to addiction, altered mental status, and depression. I stressed avoiding alcohol, benzodiazepines, muscle relaxants and sleep aids unless specifically okayed by a physician. The patient verbalized understanding of the proper use and possible adverse effects of opioids. All of the patient's questions and concerns were addressed. They were instructed to flush the remaining pills down the toilet if they did not need them for pain. Griseofulvin Counseling:  I discussed with the patient the risks of griseofulvin including but not limited to photosensitivity, cytopenia, liver damage, nausea/vomiting and severe allergy. The patient understands that this medication is best absorbed when taken with a fatty meal (e.g., ice cream or french fries). Clindamycin Counseling: I counseled the patient regarding use of clindamycin as an antibiotic for prophylactic and/or therapeutic purposes. Clindamycin is active against numerous classes of bacteria, including skin bacteria. Side effects may include nausea, diarrhea, gastrointestinal upset, rash, hives, yeast infections, and in rare cases, colitis. Clindamycin Pregnancy And Lactation Text: This medication can be used in pregnancy if certain situations. Clindamycin is also present in breast milk. Skyrizi Counseling: I discussed with the patient the risks of risankizumab-rzaa including but not limited to immunosuppression, and serious infections. The patient understands that monitoring is required including a PPD at baseline and must alert us or the primary physician if symptoms of infection or other concerning signs are noted. Protopic Counseling: Patient may experience a mild burning sensation during topical application. Protopic is not approved in children less than 3years of age. There have been case reports of hematologic and skin malignancies in patients using topical calcineurin inhibitors although causality is questionable. Sski Pregnancy And Lactation Text: This medication is Pregnancy Category D and isn't considered safe during pregnancy. It is excreted in breast milk. Rituxan Pregnancy And Lactation Text: This medication is Pregnancy Category C and it isn't know if it is safe during pregnancy. It is unknown if this medication is excreted in breast milk but similar antibodies are known to be excreted. Thalidomide Counseling: I discussed with the patient the risks of thalidomide including but not limited to birth defects, anxiety, weakness, chest pain, dizziness, cough and severe allergy. 5-Fu Counseling: 5-Fluorouracil Counseling:  I discussed with the patient the risks of 5-fluorouracil including but not limited to erythema, scaling, itching, weeping, crusting, and pain. Hydroxychloroquine Pregnancy And Lactation Text: This medication has been shown to cause fetal harm but it isn't assigned a Pregnancy Risk Category. There are small amounts excreted in breast milk. Protopic Pregnancy And Lactation Text: This medication is Pregnancy Category C. It is unknown if this medication is excreted in breast milk when applied topically. Opioid Pregnancy And Lactation Text: These medications can lead to premature delivery and should be avoided during pregnancy. These medications are also present in breast milk in small amounts. Griseofulvin Pregnancy And Lactation Text: This medication is Pregnancy Category X and is known to cause serious birth defects. It is unknown if this medication is excreted in breast milk but breast feeding should be avoided. Itraconazole Counseling:  I discussed with the patient the risks of itraconazole including but not limited to liver damage, nausea/vomiting, neuropathy, and severe allergy. The patient understands that this medication is best absorbed when taken with acidic beverages such as non-diet cola or ginger ale. The patient understands that monitoring is required including baseline LFTs and repeat LFTs at intervals. The patient understands that they are to contact us or the primary physician if concerning signs are noted. Doxycycline Counseling:  Patient counseled regarding possible photosensitivity and increased risk for sunburn. Patient instructed to avoid sunlight, if possible. When exposed to sunlight, patients should wear protective clothing, sunglasses, and sunscreen. The patient was instructed to call the office immediately if the following severe adverse effects occur:  hearing changes, easy bruising/bleeding, severe headache, or vision changes. The patient verbalized understanding of the proper use and possible adverse effects of doxycycline. All of the patient's questions and concerns were addressed. Siliq Counseling:  I discussed with the patient the risks of Siliq including but not limited to new or worsening depression, suicidal thoughts and behavior, immunosuppression, malignancy, posterior leukoencephalopathy syndrome, and serious infections. The patient understands that monitoring is required including a PPD at baseline and must alert us or the primary physician if symptoms of infection or other concerning signs are noted. There is also a special program designed to monitor depression which is required with Siliq. Cyclosporine Pregnancy And Lactation Text: This medication is Pregnancy Category C and it isn't know if it is safe during pregnancy. This medication is excreted in breast milk. Methotrexate Counseling:  Patient counseled regarding adverse effects of methotrexate including but not limited to nausea, vomiting, abnormalities in liver function tests. Patients may develop mouth sores, rash, diarrhea, and abnormalities in blood counts. The patient understands that monitoring is required including LFT's and blood counts. There is a rare possibility of scarring of the liver and lung problems that can occur when taking methotrexate. Persistent nausea, loss of appetite, pale stools, dark urine, cough, and shortness of breath should be reported immediately. Patient advised to discontinue methotrexate treatment at least three months before attempting to become pregnant. I discussed the need for folate supplements while taking methotrexate. These supplements can decrease side effects during methotrexate treatment. The patient verbalized understanding of the proper use and possible adverse effects of methotrexate. All of the patient's questions and concerns were addressed. Rhofade Counseling: Rhofade is a topical medication which can decrease superficial blood flow where applied. Side effects are uncommon and include stinging, redness and allergic reactions. Simponi Counseling:  I discussed with the patient the risks of golimumab including but not limited to myelosuppression, immunosuppression, autoimmune hepatitis, demyelinating diseases, lymphoma, and serious infections. The patient understands that monitoring is required including a PPD at baseline and must alert us or the primary physician if symptoms of infection or other concerning signs are noted. Stelara Counseling:  I discussed with the patient the risks of ustekinumab including but not limited to immunosuppression, malignancy, posterior leukoencephalopathy syndrome, and serious infections. The patient understands that monitoring is required including a PPD at baseline and must alert us or the primary physician if symptoms of infection or other concerning signs are noted. Niacinamide Counseling: I recommended taking niacin or niacinamide, also know as vitamin B3, twice daily. Recent evidence suggests that taking vitamin B3 (500 mg twice daily) can reduce the risk of actinic keratoses and non-melanoma skin cancers. Side effects of vitamin B3 include flushing and headache. Doxycycline Pregnancy And Lactation Text: This medication is Pregnancy Category D and not consider safe during pregnancy. It is also excreted in breast milk but is considered safe for shorter treatment courses. Niacinamide Pregnancy And Lactation Text: These medications are considered safe during pregnancy. Arava Counseling:  Patient counseled regarding adverse effects of Arava including but not limited to nausea, vomiting, abnormalities in liver function tests. Patients may develop mouth sores, rash, diarrhea, and abnormalities in blood counts. The patient understands that monitoring is required including LFTs and blood counts. There is a rare possibility of scarring of the liver and lung problems that can occur when taking methotrexate. Persistent nausea, loss of appetite, pale stools, dark urine, cough, and shortness of breath should be reported immediately. Patient advised to discontinue Arava treatment and consult with a physician prior to attempting conception. The patient will have to undergo a treatment to eliminate Arava from the body prior to conception. Drysol Counseling:  I discussed with the patient the risks of drysol/aluminum chloride including but not limited to skin rash, itching, irritation, burning. Cimzia Counseling:  I discussed with the patient the risks of Cimzia including but not limited to immunosuppression, allergic reactions and infections. The patient understands that monitoring is required including a PPD at baseline and must alert us or the primary physician if symptoms of infection or other concerning signs are noted. Tranexamic Acid Counseling:  Patient advised of the small risk of bleeding problems with tranexamic acid. They were also instructed to call if they developed any nausea, vomiting or diarrhea. All of the patient's questions and concerns were addressed. Methotrexate Pregnancy And Lactation Text: This medication is Pregnancy Category X and is known to cause fetal harm. This medication is excreted in breast milk. Drysol Pregnancy And Lactation Text: This medication is considered safe during pregnancy and breast feeding. Taltz Counseling: I discussed with the patient the risks of ixekizumab including but not limited to immunosuppression, serious infections, worsening of inflammatory bowel disease and drug reactions. The patient understands that monitoring is required including a PPD at baseline and must alert us or the primary physician if symptoms of infection or other concerning signs are noted. Tranexamic Acid Pregnancy And Lactation Text: It is unknown if this medication is safe during pregnancy or breast feeding. Erythromycin Counseling:  I discussed with the patient the risks of erythromycin including but not limited to GI upset, allergic reaction, drug rash, diarrhea, increase in liver enzymes, and yeast infections. Solaraze Counseling:  I discussed with the patient the risks of Solaraze including but not limited to erythema, scaling, itching, weeping, crusting, and pain. Nsaids Counseling: NSAID Counseling: I discussed with the patient that NSAIDs should be taken with food. Prolonged use of NSAIDs can result in the development of stomach ulcers. Patient advised to stop taking NSAIDs if abdominal pain occurs. The patient verbalized understanding of the proper use and possible adverse effects of NSAIDs. All of the patient's questions and concerns were addressed. Ketoconazole Counseling:   Patient counseled regarding improving absorption with orange juice. Adverse effects include but are not limited to breast enlargement, headache, diarrhea, nausea, upset stomach, liver function test abnormalities, taste disturbance, and stomach pain. There is a rare possibility of liver failure that can occur when taking ketoconazole. The patient understands that monitoring of LFTs may be required, especially at baseline. The patient verbalized understanding of the proper use and possible adverse effects of ketoconazole. All of the patient's questions and concerns were addressed. Clofazimine Counseling:  I discussed with the patient the risks of clofazimine including but not limited to skin and eye pigmentation, liver damage, nausea/vomiting, gastrointestinal bleeding and allergy. Ketoconazole Pregnancy And Lactation Text: This medication is Pregnancy Category C and it isn't know if it is safe during pregnancy. It is also excreted in breast milk and breast feeding isn't recommended. Prednisone Counseling:  I discussed with the patient the risks of prolonged use of prednisone including but not limited to weight gain, insomnia, osteoporosis, mood changes, diabetes, susceptibility to infection, glaucoma and high blood pressure. In cases where prednisone use is prolonged, patients should be monitored with blood pressure checks, serum glucose levels and an eye exam.  Additionally, the patient may need to be placed on GI prophylaxis, PCP prophylaxis, and calcium and vitamin D supplementation and/or a bisphosphonate. The patient verbalized understanding of the proper use and the possible adverse effects of prednisone. All of the patient's questions and concerns were addressed. Elidel Counseling: Patient may experience a mild burning sensation during topical application. Elidel is not approved in children less than 3years of age. There have been case reports of hematologic and skin malignancies in patients using topical calcineurin inhibitors although causality is questionable. Valtrex Counseling: I discussed with the patient the risks of valacyclovir including but not limited to kidney damage, nausea, vomiting and severe allergy. The patient understands that if the infection seems to be worsening or is not improving, they are to call. Erythromycin Pregnancy And Lactation Text: This medication is Pregnancy Category B and is considered safe during pregnancy. It is also excreted in breast milk. Solaraze Pregnancy And Lactation Text: This medication is Pregnancy Category B and is considered safe. There is some data to suggest avoiding during the third trimester. It is unknown if this medication is excreted in breast milk. Cimzia Pregnancy And Lactation Text: This medication crosses the placenta but can be considered safe in certain situations. Cimzia may be excreted in breast milk. Nsaids Pregnancy And Lactation Text: These medications are considered safe up to 30 weeks gestation. It is excreted in breast milk.
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
## 2020-08-01 NOTE — PROGRESS NOTE ADULT - SUBJECTIVE AND OBJECTIVE BOX
Interventional Cardiology PA Adult Progress Note    C.C.: Chest Pain     Subjective Assessment: Patient seen and examined at bedside. Upon exam, patient complaining of sharp substernal chest pain w/o SOB or any other associated symptoms. Patient laying comfortable in bed getting EKG done upon exam.     ROS Negative except as per Subjective and HPI  	  MEDICATIONS:  enalapril 10 milliGRAM(s) Oral daily  hydrochlorothiazide 25 milliGRAM(s) Oral daily  metoprolol succinate ER 50 milliGRAM(s) Oral daily  NIFEdipine XL 60 milliGRAM(s) Oral daily  acetaminophen   Tablet .. 650 milliGRAM(s) Oral every 6 hours PRN  atorvastatin 40 milliGRAM(s) Oral at bedtime  dextrose 40% Gel 15 Gram(s) Oral once PRN  dextrose 50% Injectable 12.5 Gram(s) IV Push once  dextrose 50% Injectable 25 Gram(s) IV Push once  dextrose 50% Injectable 25 Gram(s) IV Push once  glucagon  Injectable 1 milliGRAM(s) IntraMuscular once PRN  insulin glargine Injectable (LANTUS) 20 Unit(s) SubCutaneous at bedtime  insulin lispro (HumaLOG) corrective regimen sliding scale   SubCutaneous Before meals and at bedtime  aspirin enteric coated 81 milliGRAM(s) Oral daily  dextrose 5%. 1000 milliLiter(s) IV Continuous <Continuous>  enoxaparin Injectable 40 milliGRAM(s) SubCutaneous daily      PHYSICAL EXAM:  TELEMETRY: No overnight events.   T(C): 36.7 (08-01-20 @ 17:16), Max: 37 (07-31-20 @ 20:56)  HR: 71 (08-01-20 @ 16:32) (65 - 92)  BP: 144/71 (08-01-20 @ 16:32) (110/62 - 172/83)  RR: 17 (08-01-20 @ 16:32) (16 - 19)  SpO2: 99% (08-01-20 @ 16:32) (98% - 100%)  Wt(kg): --  I&O's Summary    31 Jul 2020 07:01  -  01 Aug 2020 07:00  --------------------------------------------------------  IN: 100 mL / OUT: 0 mL / NET: 100 mL      Height (cm): 172.72 (08-01 @ 00:01)  Weight (kg): 94.3 (08-01 @ 00:01)  BMI (kg/m2): 31.6 (08-01 @ 00:01)  BSA (m2): 2.08 (08-01 @ 00:01)                                      Appearance: NAD 	  HEENT: Normal oral mucosa, PERRL, EOMI	  Neck: Supple, - JVD  Cardiovascular: Normal S1/S2, No JVD, No murmurs, Mild tenderness w/ palpation of sternal chest area    Respiratory: Lungs clear to auscultation, No Rales, Rhonchi, Wheezing	  Gastrointestinal:  Soft, Non-tender  Skin: No rashes, No ecchymoses, No cyanosis  Extremities: Normal range of motion, No clubbing, cyanosis or edema  Vascular: BKA b/l   Neurologic: Non-focal  Psychiatry: A & O x 3, Mood & affect appropriate      	    ECG:  	  RADIOLOGY:   DIAGNOSTIC TESTING:  [X] Echocardiogram (08/01/2020): mild symmetric LV hypertrophy, normal LV and RV size and function, EF 65%, doppler suggestive of grade II diastolic function but not conclusive, no valvular disease, no pulmonary hypertension, and no pericardial effusion    [ ]  Catheterization:  [ ] Stress Test:    [ ] GI  OTHER: 	    LABS:	 	  CARDIAC MARKERS ( 01 Aug 2020 06:41 )  x     / 0.01 ng/mL / 119 U/L / x     / 2.8 ng/mL  CARDIAC MARKERS ( 01 Aug 2020 01:43 )  x     / 0.01 ng/mL / 141 U/L / x     / 2.8 ng/mL  CARDIAC MARKERS ( 31 Jul 2020 21:19 )  x     / <0.01 ng/mL / x     / x     / x                              12.3   6.78  )-----------( 202      ( 01 Aug 2020 06:41 )             38.6     08-01    143  |  105  |  24<H>  ----------------------------<  116<H>  3.8   |  28  |  0.96    Ca    9.2      01 Aug 2020 06:41  Mg     2.3     08-01    TPro  7.8  /  Alb  3.9  /  TBili  0.4  /  DBili  x   /  AST  20  /  ALT  18  /  AlkPhos  88  07-31    proBNP:   Lipid Profile:   HgA1c:   TSH:   PT/INR - ( 31 Jul 2020 21:19 )   PT: 13.4 sec;   INR: 1.12          PTT - ( 31 Jul 2020 21:19 )  PTT:31.3 sec

## 2020-08-02 LAB
GLUCOSE BLDC GLUCOMTR-MCNC: 108 MG/DL — HIGH (ref 70–99)
GLUCOSE BLDC GLUCOMTR-MCNC: 118 MG/DL — HIGH (ref 70–99)
GLUCOSE BLDC GLUCOMTR-MCNC: 200 MG/DL — HIGH (ref 70–99)
GLUCOSE BLDC GLUCOMTR-MCNC: 86 MG/DL — SIGNIFICANT CHANGE UP (ref 70–99)

## 2020-08-02 PROCEDURE — 99233 SBSQ HOSP IP/OBS HIGH 50: CPT

## 2020-08-02 RX ADMIN — Medication 10 MILLIGRAM(S): at 07:29

## 2020-08-02 RX ADMIN — Medication 2: at 11:48

## 2020-08-02 RX ADMIN — INSULIN GLARGINE 20 UNIT(S): 100 INJECTION, SOLUTION SUBCUTANEOUS at 21:52

## 2020-08-02 RX ADMIN — Medication 50 MILLIGRAM(S): at 07:29

## 2020-08-02 RX ADMIN — Medication 25 MILLIGRAM(S): at 07:29

## 2020-08-02 RX ADMIN — Medication 81 MILLIGRAM(S): at 11:20

## 2020-08-02 RX ADMIN — Medication 60 MILLIGRAM(S): at 07:28

## 2020-08-02 RX ADMIN — ENOXAPARIN SODIUM 40 MILLIGRAM(S): 100 INJECTION SUBCUTANEOUS at 11:20

## 2020-08-02 RX ADMIN — ATORVASTATIN CALCIUM 40 MILLIGRAM(S): 80 TABLET, FILM COATED ORAL at 21:52
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
## 2020-08-02 NOTE — PROGRESS NOTE ADULT - SUBJECTIVE AND OBJECTIVE BOX
INCOMPLETE    OVERNIGHT EVENTS:  No acute events overnight.    SUBJECTIVE / INTERVAL HPI: Patient seen and examined at bedside.    Denies CP, SOB, dizziness/lightheadedness, palpitations, orthopnea/PND, leg swelling  Denies fever/chills, fatigue, myalgias, cough, headache, sore throat, nasal congestion, nausea/vomiting/diarrhea, abd pain, poor appetite      VITAL SIGNS:  Vital Signs Last 24 Hrs  T(C): 36.1 (02 Aug 2020 13:40), Max: 36.7 (01 Aug 2020 17:16)  T(F): 96.9 (02 Aug 2020 13:40), Max: 98 (01 Aug 2020 17:16)  HR: 76 (02 Aug 2020 13:20) (62 - 76)  BP: 143/69 (02 Aug 2020 13:20) (141/71 - 163/79)  BP(mean): 97 (02 Aug 2020 13:20) (97 - 111)  RR: 18 (02 Aug 2020 13:20) (16 - 20)  SpO2: 97% (02 Aug 2020 13:20) (97% - 100%)  I&O's Summary    01 Aug 2020 07:01  -  02 Aug 2020 07:00  --------------------------------------------------------  IN: 0 mL / OUT: 800 mL / NET: -800 mL    02 Aug 2020 07:01  -  02 Aug 2020 14:52  --------------------------------------------------------  IN: 360 mL / OUT: 1350 mL / NET: -990 mL          PHYSICAL EXAM:    General: Lying in bed, NAD  HEENT:  PERRL, conjunctiva vlear; MMM  Neck: supple  Cardiovascular: RRR, no murmurs  Respiratory: CTA B/L  Gastrointestinal: soft, NT/ND, +BS  Extremities: WWP, no edema or cyanosis  Vascular: Peripheral pulses palpable 2+ bilaterally/ carotid 2+ b/l, no bruits; radial 2+ b/l; femoral 2+ b/l no bruits; DP/PT 2+ b/l  Neurological: AAOx3, no focal deficits    MEDICATIONS:  MEDICATIONS  (STANDING):  aspirin enteric coated 81 milliGRAM(s) Oral daily  atorvastatin 40 milliGRAM(s) Oral at bedtime  dextrose 5%. 1000 milliLiter(s) (50 mL/Hr) IV Continuous <Continuous>  dextrose 50% Injectable 12.5 Gram(s) IV Push once  dextrose 50% Injectable 25 Gram(s) IV Push once  dextrose 50% Injectable 25 Gram(s) IV Push once  enalapril 10 milliGRAM(s) Oral daily  enoxaparin Injectable 40 milliGRAM(s) SubCutaneous daily  hydrochlorothiazide 25 milliGRAM(s) Oral daily  insulin glargine Injectable (LANTUS) 20 Unit(s) SubCutaneous at bedtime  insulin lispro (HumaLOG) corrective regimen sliding scale   SubCutaneous Before meals and at bedtime  metoprolol succinate ER 50 milliGRAM(s) Oral daily  NIFEdipine XL 60 milliGRAM(s) Oral daily    MEDICATIONS  (PRN):  acetaminophen   Tablet .. 650 milliGRAM(s) Oral every 6 hours PRN Moderate Pain (4 - 6)  dextrose 40% Gel 15 Gram(s) Oral once PRN Blood Glucose LESS THAN 70 milliGRAM(s)/deciliter  glucagon  Injectable 1 milliGRAM(s) IntraMuscular once PRN Glucose LESS THAN 70 milligrams/deciliter      LABS:                        12.3   6.78  )-----------( 202      ( 01 Aug 2020 06:41 )             38.6       08-01    143  |  105  |  24<H>  ----------------------------<  116<H>  3.8   |  28  |  0.96    Ca    9.2      01 Aug 2020 06:41  Mg     2.3     08-01    TPro  7.8  /  Alb  3.9  /  TBili  0.4  /  DBili  x   /  AST  20  /  ALT  18  /  AlkPhos  88  07-31      PT/INR - ( 31 Jul 2020 21:19 )   PT: 13.4 sec;   INR: 1.12          PTT - ( 31 Jul 2020 21:19 )  PTT:31.3 sec    CARDIAC MARKERS ( 01 Aug 2020 06:41 )  x     / 0.01 ng/mL / 119 U/L / x     / 2.8 ng/mL  CARDIAC MARKERS ( 01 Aug 2020 01:43 )  x     / 0.01 ng/mL / 141 U/L / x     / 2.8 ng/mL  CARDIAC MARKERS ( 31 Jul 2020 21:19 )  x     / <0.01 ng/mL / x     / x     / x            TELEMETRY:    RADIOLOGY & ADDITIONAL TESTS: Reviewed. OVERNIGHT EVENTS:  No acute events overnight.    SUBJECTIVE / INTERVAL HPI: Patient seen and examined at bedside. Denies CP, SOB, dizziness/lightheadedness, palpitations      VITAL SIGNS:  Vital Signs Last 24 Hrs  T(C): 36.1 (02 Aug 2020 13:40), Max: 36.7 (01 Aug 2020 17:16)  T(F): 96.9 (02 Aug 2020 13:40), Max: 98 (01 Aug 2020 17:16)  HR: 76 (02 Aug 2020 13:20) (62 - 76)  BP: 143/69 (02 Aug 2020 13:20) (141/71 - 163/79)  BP(mean): 97 (02 Aug 2020 13:20) (97 - 111)  RR: 18 (02 Aug 2020 13:20) (16 - 20)  SpO2: 97% (02 Aug 2020 13:20) (97% - 100%)  I&O's Summary    01 Aug 2020 07:01  -  02 Aug 2020 07:00  --------------------------------------------------------  IN: 0 mL / OUT: 800 mL / NET: -800 mL    02 Aug 2020 07:01  -  02 Aug 2020 14:52  --------------------------------------------------------  IN: 360 mL / OUT: 1350 mL / NET: -990 mL          PHYSICAL EXAM:    General: Lying in bed, NAD  HEENT:  PERRL, conjunctiva clear; MMM  Neck: supple  Cardiovascular: RRR, no murmurs  Respiratory: CTA B/L  Gastrointestinal: soft, NT/ND, +BS  Extremities: WWP, no edema or cyanosis  Neurological: AAOx3, no focal deficits    MEDICATIONS:  MEDICATIONS  (STANDING):  aspirin enteric coated 81 milliGRAM(s) Oral daily  atorvastatin 40 milliGRAM(s) Oral at bedtime  dextrose 5%. 1000 milliLiter(s) (50 mL/Hr) IV Continuous <Continuous>  dextrose 50% Injectable 12.5 Gram(s) IV Push once  dextrose 50% Injectable 25 Gram(s) IV Push once  dextrose 50% Injectable 25 Gram(s) IV Push once  enalapril 10 milliGRAM(s) Oral daily  enoxaparin Injectable 40 milliGRAM(s) SubCutaneous daily  hydrochlorothiazide 25 milliGRAM(s) Oral daily  insulin glargine Injectable (LANTUS) 20 Unit(s) SubCutaneous at bedtime  insulin lispro (HumaLOG) corrective regimen sliding scale   SubCutaneous Before meals and at bedtime  metoprolol succinate ER 50 milliGRAM(s) Oral daily  NIFEdipine XL 60 milliGRAM(s) Oral daily    MEDICATIONS  (PRN):  acetaminophen   Tablet .. 650 milliGRAM(s) Oral every 6 hours PRN Moderate Pain (4 - 6)  dextrose 40% Gel 15 Gram(s) Oral once PRN Blood Glucose LESS THAN 70 milliGRAM(s)/deciliter  glucagon  Injectable 1 milliGRAM(s) IntraMuscular once PRN Glucose LESS THAN 70 milligrams/deciliter      LABS:                        12.3   6.78  )-----------( 202      ( 01 Aug 2020 06:41 )             38.6       08-01    143  |  105  |  24<H>  ----------------------------<  116<H>  3.8   |  28  |  0.96    Ca    9.2      01 Aug 2020 06:41  Mg     2.3     08-01    TPro  7.8  /  Alb  3.9  /  TBili  0.4  /  DBili  x   /  AST  20  /  ALT  18  /  AlkPhos  88  07-31      PT/INR - ( 31 Jul 2020 21:19 )   PT: 13.4 sec;   INR: 1.12          PTT - ( 31 Jul 2020 21:19 )  PTT:31.3 sec    CARDIAC MARKERS ( 01 Aug 2020 06:41 )  x     / 0.01 ng/mL / 119 U/L / x     / 2.8 ng/mL  CARDIAC MARKERS ( 01 Aug 2020 01:43 )  x     / 0.01 ng/mL / 141 U/L / x     / 2.8 ng/mL  CARDIAC MARKERS ( 31 Jul 2020 21:19 )  x     / <0.01 ng/mL / x     / x     / x          RADIOLOGY & ADDITIONAL TESTS: Reviewed.

## 2020-08-02 NOTE — DISCHARGE NOTE NURSING/CASE MANAGEMENT/SOCIAL WORK - PATIENT PORTAL LINK FT
You can access the FollowMyHealth Patient Portal offered by [facility] by registering at the following website: [email or web address]. By joining [employer]’s FollowMyHealth portal, you will also be able to view your health information using other applications (apps) compatible with our system.
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
## 2020-08-02 NOTE — DISCHARGE NOTE PROVIDER - NSDCMRMEDTOKEN_GEN_ALL_CORE_FT
Aspirin Enteric Coated 81 mg oral delayed release tablet: 1 tab(s) orally once a day  atorvastatin 40 mg oral tablet: 1 tab(s) orally once a day  enalapril-hydrochlorothiazide 10 mg-25 mg oral tablet: 1 tab(s) orally once a day  Lantus 100 units/mL subcutaneous solution: 20 unit(s) subcutaneous once a day (at bedtime)  Metoprolol Succinate ER 50 mg oral tablet, extended release: 1 tab(s) orally once a day  NIFEdipine 60 mg oral tablet, extended release: 1 tab(s) orally once a day Aspirin Enteric Coated 81 mg oral delayed release tablet: 1 tab(s) orally once a day  atorvastatin 40 mg oral tablet: 1 tab(s) orally once a day  enalapril-hydrochlorothiazide 10 mg-25 mg oral tablet: 1 tab(s) orally once a day  Lantus 100 units/mL subcutaneous solution: 20 unit(s) subcutaneous once a day (at bedtime)  Metoprolol Succinate ER 50 mg oral tablet, extended release: 1 tab(s) orally once a day  NIFEdipine 60 mg oral tablet, extended release: 1 tab(s) orally once a day  standard wheelchair: Indication: bilateral below-knee amputation(ICD10- Z89.512)  Length of need: 99 days

## 2020-08-02 NOTE — PROGRESS NOTE ADULT - PROBLEM SELECTOR PLAN 4
continue FS's, Lantus 20units subQ q hs and ICS PRN    VTE PPx: Lovenox  Dispo: d/c in AM, needs      Case d/w Dr. Ruiz continue FS's, Lantus 20units subQ q hs and ICS PRN    VTE PPx: Lovenox  Dispo: d/c in AM with wheelchair    Case d/w Dr. Ruiz

## 2020-08-03 LAB
GLUCOSE BLDC GLUCOMTR-MCNC: 124 MG/DL — HIGH (ref 70–99)
GLUCOSE BLDC GLUCOMTR-MCNC: 136 MG/DL — HIGH (ref 70–99)
GLUCOSE BLDC GLUCOMTR-MCNC: 143 MG/DL — HIGH (ref 70–99)
GLUCOSE BLDC GLUCOMTR-MCNC: 86 MG/DL — SIGNIFICANT CHANGE UP (ref 70–99)

## 2020-08-03 PROCEDURE — 99233 SBSQ HOSP IP/OBS HIGH 50: CPT

## 2020-08-03 RX ADMIN — Medication 60 MILLIGRAM(S): at 05:53

## 2020-08-03 RX ADMIN — Medication 10 MILLIGRAM(S): at 05:53

## 2020-08-03 RX ADMIN — ENOXAPARIN SODIUM 40 MILLIGRAM(S): 100 INJECTION SUBCUTANEOUS at 11:32

## 2020-08-03 RX ADMIN — Medication 81 MILLIGRAM(S): at 11:32

## 2020-08-03 RX ADMIN — ATORVASTATIN CALCIUM 40 MILLIGRAM(S): 80 TABLET, FILM COATED ORAL at 21:16

## 2020-08-03 RX ADMIN — Medication 50 MILLIGRAM(S): at 05:53

## 2020-08-03 RX ADMIN — INSULIN GLARGINE 20 UNIT(S): 100 INJECTION, SOLUTION SUBCUTANEOUS at 21:16

## 2020-08-03 RX ADMIN — Medication 25 MILLIGRAM(S): at 05:53

## 2020-08-03 NOTE — PROGRESS NOTE ADULT - SUBJECTIVE AND OBJECTIVE BOX
OVERNIGHT EVENTS:  No acute events overnight.    SUBJECTIVE / INTERVAL HPI: Patient seen and examined at bedside. Comfortable and without complaints.       VITAL SIGNS:  Vital Signs Last 24 Hrs  T(C): 36.4 (03 Aug 2020 09:03), Max: 37.3 (02 Aug 2020 22:12)  T(F): 97.5 (03 Aug 2020 09:03), Max: 99.1 (02 Aug 2020 22:12)  HR: 74 (03 Aug 2020 09:10) (61 - 76)  BP: 126/67 (03 Aug 2020 09:10) (126/67 - 163/87)  BP(mean): 90 (03 Aug 2020 09:10) (90 - 119)  RR: 16 (03 Aug 2020 09:10) (14 - 18)  SpO2: 99% (03 Aug 2020 09:10) (96% - 100%)  I&O's Summary    02 Aug 2020 07:01  -  03 Aug 2020 07:00  --------------------------------------------------------  IN: 460 mL / OUT: 2100 mL / NET: -1640 mL    03 Aug 2020 07:01  -  03 Aug 2020 12:33  --------------------------------------------------------  IN: 320 mL / OUT: 400 mL / NET: -80 mL          PHYSICAL EXAM:  General: Lying in bed, NAD  HEENT: conjunctiva clear; MMM  Neck: supple  Cardiovascular: RRR, no murmurs  Respiratory: CTA B/L  Gastrointestinal: soft, NT/ND, +BS  Extremities: WWP, no edema or cyanosis. Bilateral BKA  Neurological: AAOx3, no focal deficits      MEDICATIONS  (STANDING):  aspirin enteric coated 81 milliGRAM(s) Oral daily  atorvastatin 40 milliGRAM(s) Oral at bedtime  dextrose 5%. 1000 milliLiter(s) (50 mL/Hr) IV Continuous <Continuous>  dextrose 50% Injectable 12.5 Gram(s) IV Push once  dextrose 50% Injectable 25 Gram(s) IV Push once  dextrose 50% Injectable 25 Gram(s) IV Push once  enalapril 10 milliGRAM(s) Oral daily  enoxaparin Injectable 40 milliGRAM(s) SubCutaneous daily  hydrochlorothiazide 25 milliGRAM(s) Oral daily  insulin glargine Injectable (LANTUS) 20 Unit(s) SubCutaneous at bedtime  insulin lispro (HumaLOG) corrective regimen sliding scale   SubCutaneous Before meals and at bedtime  metoprolol succinate ER 50 milliGRAM(s) Oral daily  NIFEdipine XL 60 milliGRAM(s) Oral daily    MEDICATIONS  (PRN):  acetaminophen   Tablet .. 650 milliGRAM(s) Oral every 6 hours PRN Moderate Pain (4 - 6)  dextrose 40% Gel 15 Gram(s) Oral once PRN Blood Glucose LESS THAN 70 milliGRAM(s)/deciliter  glucagon  Injectable 1 milliGRAM(s) IntraMuscular once PRN Glucose LESS THAN 70 milligrams/deciliter      LABS: none
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
## 2020-08-03 NOTE — PROGRESS NOTE ADULT - PROBLEM SELECTOR PLAN 4
continue FS's, Lantus 20units subQ q hs and ICS PRN    VTE PPx: Lovenox  Dispo: d/c 8/4 AM with wheelchair    Case d/w Dr. Ruiz

## 2020-08-04 LAB
GLUCOSE BLDC GLUCOMTR-MCNC: 126 MG/DL — HIGH (ref 70–99)
GLUCOSE BLDC GLUCOMTR-MCNC: 140 MG/DL — HIGH (ref 70–99)
GLUCOSE BLDC GLUCOMTR-MCNC: 168 MG/DL — HIGH (ref 70–99)
GLUCOSE BLDC GLUCOMTR-MCNC: 98 MG/DL — SIGNIFICANT CHANGE UP (ref 70–99)

## 2020-08-04 PROCEDURE — 99233 SBSQ HOSP IP/OBS HIGH 50: CPT

## 2020-08-04 RX ADMIN — Medication 60 MILLIGRAM(S): at 06:04

## 2020-08-04 RX ADMIN — Medication 81 MILLIGRAM(S): at 11:37

## 2020-08-04 RX ADMIN — Medication 25 MILLIGRAM(S): at 06:04

## 2020-08-04 RX ADMIN — INSULIN GLARGINE 20 UNIT(S): 100 INJECTION, SOLUTION SUBCUTANEOUS at 21:45

## 2020-08-04 RX ADMIN — Medication 2: at 11:37

## 2020-08-04 RX ADMIN — Medication 10 MILLIGRAM(S): at 06:04

## 2020-08-04 RX ADMIN — ATORVASTATIN CALCIUM 40 MILLIGRAM(S): 80 TABLET, FILM COATED ORAL at 21:45

## 2020-08-04 RX ADMIN — ENOXAPARIN SODIUM 40 MILLIGRAM(S): 100 INJECTION SUBCUTANEOUS at 11:37

## 2020-08-04 RX ADMIN — Medication 50 MILLIGRAM(S): at 06:04

## 2020-08-04 NOTE — PROGRESS NOTE ADULT - PROBLEM SELECTOR PLAN 4
continue FS's, Lantus 20units subQ q hs and ICS PRN    VTE PPx: Lovenox  Dispo: d/c 8/5, pending delivery of wheelchair    Case d/w Dr. Ruiz
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
## 2020-08-04 NOTE — PROGRESS NOTE ADULT - SUBJECTIVE AND OBJECTIVE BOX
INCOMPLETE    OVERNIGHT EVENTS:  No acute events overnight.    SUBJECTIVE / INTERVAL HPI: Patient seen and examined at bedside.    Denies CP, SOB, dizziness/lightheadedness, palpitations, orthopnea/PND, leg swelling  Denies fever/chills, fatigue, myalgias, cough, headache, sore throat, nasal congestion, nausea/vomiting/diarrhea, abd pain, poor appetite      VITAL SIGNS:  Vital Signs Last 24 Hrs  T(C): 36.2 (04 Aug 2020 17:22), Max: 37.1 (04 Aug 2020 12:56)  T(F): 97.2 (04 Aug 2020 17:22), Max: 98.8 (04 Aug 2020 12:56)  HR: 72 (04 Aug 2020 16:40) (70 - 85)  BP: 143/75 (04 Aug 2020 16:40) (123/67 - 163/89)  BP(mean): 103 (04 Aug 2020 16:40) (86 - 118)  RR: 16 (04 Aug 2020 16:40) (16 - 18)  SpO2: 99% (04 Aug 2020 16:40) (99% - 100%)  I&O's Summary    03 Aug 2020 07:01  -  04 Aug 2020 07:00  --------------------------------------------------------  IN: 1040 mL / OUT: 1400 mL / NET: -360 mL    04 Aug 2020 07:01  -  04 Aug 2020 18:25  --------------------------------------------------------  IN: 840 mL / OUT: 500 mL / NET: 340 mL      PHYSICAL EXAM:  General: Lying in bed, NAD  HEENT: conjunctiva clear; MMM  Neck: supple  Cardiovascular: RRR, no murmurs  Respiratory: CTA B/L  Gastrointestinal: soft, NT/ND, +BS  Extremities: WWP, no edema or cyanosis. Bilateral BKA  Neurological: AAOx3, no focal deficits      MEDICATIONS:  MEDICATIONS  (STANDING):  aspirin enteric coated 81 milliGRAM(s) Oral daily  atorvastatin 40 milliGRAM(s) Oral at bedtime  dextrose 5%. 1000 milliLiter(s) (50 mL/Hr) IV Continuous <Continuous>  dextrose 50% Injectable 12.5 Gram(s) IV Push once  dextrose 50% Injectable 25 Gram(s) IV Push once  dextrose 50% Injectable 25 Gram(s) IV Push once  enalapril 10 milliGRAM(s) Oral daily  enoxaparin Injectable 40 milliGRAM(s) SubCutaneous daily  hydrochlorothiazide 25 milliGRAM(s) Oral daily  insulin glargine Injectable (LANTUS) 20 Unit(s) SubCutaneous at bedtime  insulin lispro (HumaLOG) corrective regimen sliding scale   SubCutaneous Before meals and at bedtime  metoprolol succinate ER 50 milliGRAM(s) Oral daily  NIFEdipine XL 60 milliGRAM(s) Oral daily    MEDICATIONS  (PRN):  acetaminophen   Tablet .. 650 milliGRAM(s) Oral every 6 hours PRN Moderate Pain (4 - 6)  dextrose 40% Gel 15 Gram(s) Oral once PRN Blood Glucose LESS THAN 70 milliGRAM(s)/deciliter  glucagon  Injectable 1 milliGRAM(s) IntraMuscular once PRN Glucose LESS THAN 70 milligrams/deciliter      LABS: none INCOMPLETE    OVERNIGHT EVENTS:  No acute events overnight.    SUBJECTIVE / INTERVAL HPI: Patient seen and examined at bedside. Comfortable, no complaints, no CP or SOB      VITAL SIGNS:  Vital Signs Last 24 Hrs  T(C): 36.2 (04 Aug 2020 17:22), Max: 37.1 (04 Aug 2020 12:56)  T(F): 97.2 (04 Aug 2020 17:22), Max: 98.8 (04 Aug 2020 12:56)  HR: 72 (04 Aug 2020 16:40) (70 - 85)  BP: 143/75 (04 Aug 2020 16:40) (123/67 - 163/89)  BP(mean): 103 (04 Aug 2020 16:40) (86 - 118)  RR: 16 (04 Aug 2020 16:40) (16 - 18)  SpO2: 99% (04 Aug 2020 16:40) (99% - 100%)  I&O's Summary    03 Aug 2020 07:01  -  04 Aug 2020 07:00  --------------------------------------------------------  IN: 1040 mL / OUT: 1400 mL / NET: -360 mL    04 Aug 2020 07:01  -  04 Aug 2020 18:25  --------------------------------------------------------  IN: 840 mL / OUT: 500 mL / NET: 340 mL      PHYSICAL EXAM:  General: Lying in bed, NAD  HEENT: conjunctiva clear; MMM  Neck: supple  Cardiovascular: RRR, no murmurs  Respiratory: CTA B/L  Gastrointestinal: soft, NT/ND, +BS  Extremities: WWP, no edema or cyanosis. Bilateral BKA  Neurological: AAOx3, no focal deficits      MEDICATIONS:  MEDICATIONS  (STANDING):  aspirin enteric coated 81 milliGRAM(s) Oral daily  atorvastatin 40 milliGRAM(s) Oral at bedtime  dextrose 5%. 1000 milliLiter(s) (50 mL/Hr) IV Continuous <Continuous>  dextrose 50% Injectable 12.5 Gram(s) IV Push once  dextrose 50% Injectable 25 Gram(s) IV Push once  dextrose 50% Injectable 25 Gram(s) IV Push once  enalapril 10 milliGRAM(s) Oral daily  enoxaparin Injectable 40 milliGRAM(s) SubCutaneous daily  hydrochlorothiazide 25 milliGRAM(s) Oral daily  insulin glargine Injectable (LANTUS) 20 Unit(s) SubCutaneous at bedtime  insulin lispro (HumaLOG) corrective regimen sliding scale   SubCutaneous Before meals and at bedtime  metoprolol succinate ER 50 milliGRAM(s) Oral daily  NIFEdipine XL 60 milliGRAM(s) Oral daily    MEDICATIONS  (PRN):  acetaminophen   Tablet .. 650 milliGRAM(s) Oral every 6 hours PRN Moderate Pain (4 - 6)  dextrose 40% Gel 15 Gram(s) Oral once PRN Blood Glucose LESS THAN 70 milliGRAM(s)/deciliter  glucagon  Injectable 1 milliGRAM(s) IntraMuscular once PRN Glucose LESS THAN 70 milligrams/deciliter      LABS: none

## 2020-08-04 NOTE — DIETITIAN INITIAL EVALUATION ADULT. - ENERGY NEEDS
Adj BW used to calculate needs (83kg), adjusted for B/L BKA   ABW 94.3kg, IBW 70kg, 134% IBW, ht 68", BMI 31.6   Nutrient needs based on [facility] standards of care for maintenance in adults. adjusted for age, fluid per team
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
## 2020-08-04 NOTE — PROGRESS NOTE ADULT - PROBLEM SELECTOR PLAN 3
Stable  - continue Metoprolol Succinate 50mg PO daily, Enalapril 10mg PO daily and HCTZ 25mg PO daily.

## 2020-08-04 NOTE — PROGRESS NOTE ADULT - PROBLEM SELECTOR PLAN 2
Hx of multiple PCIs and CABG @ [facility] ~8yrs ago  - continue ASA/BB/Statin.
Hx of multiple PCIs and CABG @ [facility] ~8yrs ago  - continue ASA/BB/Statin.
Hx of multiple PCIs and CABG @ [facility] ~8yrs ago  - continue ASA/BB/Statin.
Hx of multiple PCIs and CABG @ [facility] ~8yrs ago  - continue ASA/BB/Statin.

## 2020-08-05 VITALS — TEMPERATURE: 98 F

## 2020-08-05 LAB
GLUCOSE BLDC GLUCOMTR-MCNC: 200 MG/DL — HIGH (ref 70–99)
GLUCOSE BLDC GLUCOMTR-MCNC: 79 MG/DL — SIGNIFICANT CHANGE UP (ref 70–99)
GLUCOSE BLDC GLUCOMTR-MCNC: 96 MG/DL — SIGNIFICANT CHANGE UP (ref 70–99)

## 2020-08-05 PROCEDURE — 93306 TTE W/DOPPLER COMPLETE: CPT

## 2020-08-05 PROCEDURE — 85730 THROMBOPLASTIN TIME PARTIAL: CPT

## 2020-08-05 PROCEDURE — 82550 ASSAY OF CK (CPK): CPT

## 2020-08-05 PROCEDURE — 84484 ASSAY OF TROPONIN QUANT: CPT

## 2020-08-05 PROCEDURE — 85025 COMPLETE CBC W/AUTO DIFF WBC: CPT

## 2020-08-05 PROCEDURE — 99285 EMERGENCY DEPT VISIT HI MDM: CPT

## 2020-08-05 PROCEDURE — 71046 X-RAY EXAM CHEST 2 VIEWS: CPT

## 2020-08-05 PROCEDURE — 85610 PROTHROMBIN TIME: CPT

## 2020-08-05 PROCEDURE — 36415 COLL VENOUS BLD VENIPUNCTURE: CPT

## 2020-08-05 PROCEDURE — 82553 CREATINE MB FRACTION: CPT

## 2020-08-05 PROCEDURE — 87635 SARS-COV-2 COVID-19 AMP PRB: CPT

## 2020-08-05 PROCEDURE — 93005 ELECTROCARDIOGRAM TRACING: CPT

## 2020-08-05 PROCEDURE — 80053 COMPREHEN METABOLIC PANEL: CPT

## 2020-08-05 PROCEDURE — 99239 HOSP IP/OBS DSCHRG MGMT >30: CPT

## 2020-08-05 PROCEDURE — 80048 BASIC METABOLIC PNL TOTAL CA: CPT

## 2020-08-05 PROCEDURE — 82962 GLUCOSE BLOOD TEST: CPT

## 2020-08-05 PROCEDURE — 83735 ASSAY OF MAGNESIUM: CPT

## 2020-08-05 PROCEDURE — 83036 HEMOGLOBIN GLYCOSYLATED A1C: CPT

## 2020-08-05 PROCEDURE — 85027 COMPLETE CBC AUTOMATED: CPT

## 2020-08-05 PROCEDURE — 80061 LIPID PANEL: CPT

## 2020-08-05 RX ADMIN — Medication 81 MILLIGRAM(S): at 11:44

## 2020-08-05 RX ADMIN — Medication 10 MILLIGRAM(S): at 06:30

## 2020-08-05 RX ADMIN — Medication 60 MILLIGRAM(S): at 06:30

## 2020-08-05 RX ADMIN — ENOXAPARIN SODIUM 40 MILLIGRAM(S): 100 INJECTION SUBCUTANEOUS at 11:44

## 2020-08-05 RX ADMIN — Medication 2: at 12:08

## 2020-08-05 RX ADMIN — Medication 50 MILLIGRAM(S): at 06:30

## 2020-08-05 RX ADMIN — Medication 25 MILLIGRAM(S): at 06:30

## 2020-08-10 DIAGNOSIS — E11.65 TYPE 2 DIABETES MELLITUS WITH HYPERGLYCEMIA: ICD-10-CM

## 2020-08-10 DIAGNOSIS — E78.5 HYPERLIPIDEMIA, UNSPECIFIED: ICD-10-CM

## 2020-08-10 DIAGNOSIS — G89.11 ACUTE PAIN DUE TO TRAUMA: ICD-10-CM

## 2020-08-10 DIAGNOSIS — Z95.1 PRESENCE OF AORTOCORONARY BYPASS GRAFT: ICD-10-CM

## 2020-08-10 DIAGNOSIS — Z89.511 ACQUIRED ABSENCE OF RIGHT LEG BELOW KNEE: ICD-10-CM

## 2020-08-10 DIAGNOSIS — I10 ESSENTIAL (PRIMARY) HYPERTENSION: ICD-10-CM

## 2020-08-10 DIAGNOSIS — R52 PAIN, UNSPECIFIED: ICD-10-CM

## 2020-08-10 DIAGNOSIS — Z89.512 ACQUIRED ABSENCE OF LEFT LEG BELOW KNEE: ICD-10-CM

## 2020-08-10 DIAGNOSIS — I25.10 ATHEROSCLEROTIC HEART DISEASE OF NATIVE CORONARY ARTERY WITHOUT ANGINA PECTORIS: ICD-10-CM
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
## 2020-08-10 DIAGNOSIS — R07.9 CHEST PAIN, UNSPECIFIED: ICD-10-CM

## 2021-01-01 NOTE — DIETITIAN INITIAL EVALUATION ADULT. - PROBLEM SELECTOR PLAN 3
stable, continue Metoprolol Succinate 50mg PO daily, Enalapril 10mg PO daily and HCTZ 25mg PO daily. 02-Dec-2020

## 2021-09-15 NOTE — DIETITIAN INITIAL EVALUATION ADULT. - NUTRITIONGOAL OUTCOME1
Relevant Problems   No relevant active problems       Anesthetic History   No history of anesthetic complications            Review of Systems / Medical History  Patient summary reviewed, nursing notes reviewed and pertinent labs reviewed    Pulmonary  Within defined limits                 Neuro/Psych   Within defined limits           Cardiovascular    Hypertension: well controlled              Exercise tolerance: >4 METS     GI/Hepatic/Renal                Endo/Other             Other Findings              Physical Exam    Airway  Mallampati: I  TM Distance: > 6 cm  Neck ROM: normal range of motion   Mouth opening: Normal     Cardiovascular    Rhythm: regular           Dental  No notable dental hx       Pulmonary  Breath sounds clear to auscultation               Abdominal         Other Findings            Anesthetic Plan    ASA: 2  Anesthesia type: MAC          Induction: Intravenous  Anesthetic plan and risks discussed with: Patient verbalize 2-3 diet recs for DM diet

## 2022-09-15 NOTE — ED ADULT NURSE NOTE - ED CARDIAC RHYTHM
PHQ-9 score today: (PHQ-9 Total Score: 5), additional evaluation and assessment performed, follow-up plan includes but not limited to: Medication management and Referral to /Specialist  for evaluation and management. History and Physical      Amilcar Dozier  YOB: 1967    Date of Service:  9/15/2022    Chief Complaint:   Amilcar Dozier is a 48 y.o. Female who presents for complete physical examination. HPI: Patient presents today for routine physical.  She also continues to have significant neck pain.     Vitals:    09/15/22 1457   BP: 122/76   Site: Left Upper Arm   Position: Sitting   Cuff Size: Medium Adult   Pulse: 75   SpO2: 97%   Weight: 146 lb (66.2 kg)   Height: 5' 3\" (1.6 m)      Wt Readings from Last 3 Encounters:   09/15/22 146 lb (66.2 kg)   08/09/22 146 lb (66.2 kg)   06/15/22 144 lb (65.3 kg)     BP Readings from Last 3 Encounters:   09/15/22 122/76   08/09/22 92/70   06/15/22 110/78       Patient Active Problem List   Diagnosis    Pelvic pain    Left sided abdominal pain of unknown cause    Anxiety    Overweight with body mass index (BMI) of 27 to 27.9 in adult       Preventive Care:  Health Maintenance   Topic Date Due    DTaP/Tdap/Td vaccine (1 - Tdap) Never done    Cervical cancer screen  Never done    Breast cancer screen  Never done    Flu vaccine (1) Never done    COVID-19 Vaccine (1) 12/01/2022 (Originally 1/22/1968)    Shingles vaccine (1 of 2) 02/23/2023 (Originally 7/22/2018)    A1C test (Diabetic or Prediabetic)  03/08/2023    Depression Screen  09/15/2023    Colorectal Cancer Screen  03/08/2025    Lipids  03/08/2027    Hepatitis A vaccine  Aged Out    Hib vaccine  Aged Out    Meningococcal (ACWY) vaccine  Aged Out    Pneumococcal 0-64 years Vaccine  Aged Out    Hepatitis C screen  Discontinued    HIV screen  Discontinued      Hx abnormal PAP: no  Sexual activity: none   Self-breast exams: yes  Last eye exam: few years, abnormal - reading-  Exercise: no regular exercise however, about 22,000 steps daily   Dental Exam: 10/6/22 scheduled   Lipid panel:   Lab Results   Component Value Date    CHOL 198 2022    TRIG 128 2022    HDL 57 2022          There is no immunization history on file for this patient.     Allergies   Allergen Reactions    Demerol Hcl [Meperidine] Swelling     Outpatient Medications Marked as Taking for the 9/15/22 encounter (Office Visit) with DAISY Handley CNP   Medication Sig Dispense Refill    ibuprofen (ADVIL;MOTRIN) 600 MG tablet Take 1 tablet by mouth 3 times daily as needed for Pain 90 tablet 0    [] tiZANidine (ZANAFLEX) 2 MG tablet Take 1 tablet by mouth every 8 hours as needed (as needed for shoulder/ neck pain) 30 tablet 0       Past Medical History:   Diagnosis Date    GERD (gastroesophageal reflux disease)      Past Surgical History:   Procedure Laterality Date     SECTION      x2    CYST REMOVAL      rt. breast    KNEE SURGERY      x4 rt. knee, 2 on left    TONSILLECTOMY       Family History   Problem Relation Age of Onset    Emphysema Mother     Hypertension Mother     Other Father         blood clots    Hypertension Father     Diabetes Sister      Social History     Socioeconomic History    Marital status:      Spouse name: Not on file    Number of children: Not on file    Years of education: Not on file    Highest education level: Not on file   Occupational History    Not on file   Tobacco Use    Smoking status: Never    Smokeless tobacco: Never   Substance and Sexual Activity    Alcohol use: No    Drug use: No    Sexual activity: Never   Other Topics Concern    Not on file   Social History Narrative    Not on file     Social Determinants of Health     Financial Resource Strain: Low Risk     Difficulty of Paying Living Expenses: Not hard at all   Food Insecurity: No Food Insecurity    Worried About Running Out of Food in the Last Year: Never true    Ran Out of Food in the Last Year: Never true   Transportation Needs: Not on file   Physical Activity: Unknown    Days of Exercise per Week: 5 days    Minutes of Exercise per Session: Not on file   Stress: Not on file   Social Connections: Not on file   Intimate Partner Violence: Not At Risk    Fear of Current or Ex-Partner: No    Emotionally Abused: No    Physically Abused: No    Sexually Abused: No   Housing Stability: Not on file        Review of Systems   Constitutional:  Negative for activity change, fatigue and fever. HENT:  Negative for congestion and dental problem. Eyes:  Negative for discharge and itching. Respiratory:  Negative for apnea and chest tightness. Cardiovascular:  Negative for chest pain and palpitations. Gastrointestinal:  Negative for abdominal distention and abdominal pain. Endocrine: Negative for cold intolerance. Genitourinary:  Negative for dyspareunia and menstrual problem. Musculoskeletal:  Positive for myalgias and neck pain. Skin:  Negative for color change and pallor. Allergic/Immunologic: Negative for food allergies and immunocompromised state. Neurological:  Negative for dizziness and numbness. Hematological:  Negative for adenopathy. Does not bruise/bleed easily. Psychiatric/Behavioral:  Negative for sleep disturbance and suicidal ideas. The patient is nervous/anxious (well managed on Effexor). Physical Exam  Vitals reviewed. Constitutional:       General: She is not in acute distress. Appearance: Normal appearance. She is not ill-appearing. HENT:      Right Ear: Tympanic membrane, ear canal and external ear normal.      Left Ear: Ear canal and external ear normal.      Ears:      Comments: Abnormal tympanic membrane on evaluation. Patient denies any previous surgeries. Patient does note increased hearing loss of that ear. Nose: Nose normal.      Mouth/Throat:      Mouth: Mucous membranes are moist.      Pharynx: Oropharynx is clear.    Eyes: Conjunctiva/sclera: Conjunctivae normal.   Cardiovascular:      Rate and Rhythm: Normal rate and regular rhythm. Pulses:           Radial pulses are 2+ on the right side and 2+ on the left side. Dorsalis pedis pulses are 2+ on the right side and 2+ on the left side. Heart sounds: Normal heart sounds. Pulmonary:      Effort: Pulmonary effort is normal.      Breath sounds: Normal breath sounds. Abdominal:      General: Abdomen is flat. Bowel sounds are normal.      Palpations: Abdomen is soft. Tenderness: There is no abdominal tenderness. Musculoskeletal:         General: No swelling. Cervical back: Rigidity and tenderness present. No edema or signs of trauma. Pain with movement, spinous process tenderness and muscular tenderness present. Lymphadenopathy:      Cervical: No cervical adenopathy. Skin:     General: Skin is warm and dry. Capillary Refill: Capillary refill takes less than 2 seconds. Neurological:      Mental Status: She is alert and oriented to person, place, and time. Motor: No weakness. Psychiatric:         Mood and Affect: Mood normal.         Behavior: Behavior normal.         Thought Content: Thought content normal.      No data recorded       PEG 7 SCORE 9/15/2022 2/23/2022   PEG-7 Total Score 9 -   PEG-7 Total Score - 2     Interpretation of PEG-7 score:   5-9 = mild anxiety  0-14 = moderate anxiety  15+ = severe anxiety. Recommend referral to behavioral health for scores 10 or greater. Assessment/Plan:    1. Positive depression screening  2. Neck pain  -     ibuprofen (ADVIL;MOTRIN) 600 MG tablet; Take 1 tablet by mouth 3 times daily as needed for Pain, Disp-90 tablet, R-0Normal  -     tiZANidine (ZANAFLEX) 2 MG tablet; Take 1 tablet by mouth every 8 hours as needed (as needed for shoulder/ neck pain), Disp-30 tablet, R-0Normal  -     MRI CERVICAL SPINE WO CONTRAST; Future  3.  Acute pain of right shoulder  -     ibuprofen (ADVIL;MOTRIN) 600 MG tablet; Take 1 tablet by mouth 3 times daily as needed for Pain, Disp-90 tablet, R-0Normal  4. Neck pain on right side  5. Osteophyte of cervical spine  -     MRI CERVICAL SPINE WO CONTRAST; Future  6. Encounter for screening mammogram for malignant neoplasm of breast  -     [facility] DIGITAL SCREEN W OR WO CAD BILATERAL; Future  7. Hearing loss of left ear, unspecified hearing loss type  -     [facility] - Hans Aus, CNP, Otolaryngology, [place]  8. Injury of tympanic membrane of left ear, initial encounter  -     VJ Maxwell Aus, CNP, Otolaryngology, [place]    Patient to continue ibuprofen as well as Zanaflex as needed for increased neck and shoulder pain. Previous imaging results of cervical spine and shoulder, feel that MRI would be appropriate to complete at this time given ongoing complaints. Has no concerns for depression screening, and feels that her medication is well managed at this time. To complete routine screenings as ordered. Encouraged low-fat diet and exercise as tolerated. regular

## 2022-12-01 NOTE — ED PROVIDER NOTE - NS ED ATTENDING STATEMENT MOD
PAST MEDICAL HISTORY:  Arthritis     GERD (gastroesophageal reflux disease)     Hard of hearing, bilateral     Macular degeneration     Visual impairment      I have personally performed a face to face diagnostic evaluation on this patient. I have reviewed the ACP note and agree with the history, exam and plan of care, except as noted.

## 2024-02-06 ENCOUNTER — HOSPITAL ENCOUNTER (OUTPATIENT)
Facility: HOSPITAL | Age: 59
Setting detail: RECURRING SERIES
Discharge: HOME | End: 2024-02-09
Payer: COMMERCIAL

## 2024-02-06 PROCEDURE — 97161 PT EVAL LOW COMPLEX 20 MIN: CPT

## 2024-02-06 PROCEDURE — 97110 THERAPEUTIC EXERCISES: CPT

## 2024-02-06 NOTE — PROGRESS NOTES
progress to PLOF and address remaining functional goals. (see flow sheet as applicable)     Details if applicable:  HEP          Details if applicable:            Details if applicable:            Details if applicable:            Details if applicable:     Total    8 Total Reminder: MC/BC bill using total billable min of TIMED therapeutic procedures (example: do not include dry needle or estim unattended, both untimed codes, in totals to left)     [x]  Patient Education billed concurrently with other procedures       Physical Therapy Evaluation - Lumbar Spine ([facility])    OBJECTIVE    Active Movements: [] N/A   [] Too acute   [] Other:  ROM % AROM % PROM Comments:pain, area   Forward flexion 40-60 29 cm     Extension 20-30 25%     SB right 20-30      SB left 20-30      Rotation right 5-10 50%     Rotation left 5-10 50%       Neck AROM flex: 12 ext; 3 degrees SB: right:5 degrees left: 5 degrees rotation right: 8 left: 10 degrees  Shoulder AROM flex: right: 64 degrees left: 54 degrees      Dural Mobility:  SLR Sitting: [] R    [] L    [] +    [] -  @ (degrees):           Supine: [] R    [] L    [] +    [] -  @ (degrees):   Slump Test: [x] R    [x] L    [] +    [x] -  @ (degrees):   Prone Knee Bend: [] R    [] L    [] +    [] -     Palpation  [] Min  [] Mod  [] Severe    Location:  [] Min  [] Mod  [] Severe    Location:  [] Min  [] Mod  [] Severe    Location:    Strength   L(0-5) R (0-5) N/T   Hip Flexion (L1,2)   []   Knee Extension (L3,4) 4 4 []   Ankle Dorsiflexion (L4) 4- 4- []   Great Toe Extension (L5)   []   Ankle Plantarflexion (S1)   []   Knee Flexion (S1,2) 4 4- []   Upper Abdominals   []   Lower Abdominals   []   Paraspinals   []   Back Rotators   []   Gluteus Rivera   []   Other   []     Other tests/comments:  Convergence: 17 cm    Negative cervical compression test  Pain with light palpation and tightness along cervical, thoracic, lumbar paraspinals  Pain with palpation of right lateral hamstring.
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
## 2024-02-12 ENCOUNTER — HOSPITAL ENCOUNTER (OUTPATIENT)
Facility: HOSPITAL | Age: 59
Setting detail: RECURRING SERIES
Discharge: HOME | End: 2024-02-15
Payer: COMMERCIAL

## 2024-02-12 PROCEDURE — 97530 THERAPEUTIC ACTIVITIES: CPT

## 2024-02-12 PROCEDURE — 97110 THERAPEUTIC EXERCISES: CPT

## 2024-02-12 NOTE — PROGRESS NOTES
PHYSICAL / OCCUPATIONAL THERAPY - DAILY TREATMENT NOTE    Patient Name: Demario Mullins    Date: 2024    : 1965  Insurance: Payor: BCBS / Plan: BCBS OUT OF STATE / Product Type: *No Product type* /      Patient  verified Yes     Visit #   Current / Total 2 12   Time   In / Out 926 1007   Pain   In / Out 7/10 5/10   Subjective Functional Status/Changes: Pt stated that he is having some pain in the knees and neck today     TREATMENT AREA =  Other low back pain [M54.59]  Neck pain [M54.2]  Right knee pain [M25.561]    OBJECTIVE    Modalities Rationale:     decrease pain and increase tissue extensibility to improve patient's ability to progress to PLOF and address remaining functional goals.     min [] Estim Unattended, type/location:                                      []  w/ice    []  w/heat    min [] Estim Attended, type/location:                                     []  w/US     []  w/ice    []  w/heat    []  TENS insruct      min []  Mechanical Traction: type/lbs                   []  pro   []  sup   []  int   []  cont    []  before manual    []  after manual    min []  Ultrasound, settings/location:     10 min  unbill []  Ice     [x]  Heat    location/position: Seated  neck    min []  Paraffin,  details:     min []  Vasopneumatic Device, press/temp:     min []  Whirlpool / Fluido:    If using vaso (only need to measure limb vaso being performed on)      pre-treatment girth :       post-treatment girth :       measured at (landmark location) :      min []  Other:    Skin assessment post-treatment:   Intact      Therapeutic Procedures:    Tx Min Billable or 1:1 Min (if diff from Tx Min) Procedure, Rationale, Specifics   18  84925 Therapeutic Exercise (timed):  increase ROM, strength, coordination, balance, and proprioception to improve patient's ability to progress to PLOF and address remaining functional goals. (see flow sheet as applicable)     Details if applicable:       10  47412 Therapeutic

## 2024-02-14 ENCOUNTER — HOSPITAL ENCOUNTER (OUTPATIENT)
Facility: HOSPITAL | Age: 59
Setting detail: RECURRING SERIES
Discharge: HOME | End: 2024-02-17
Payer: COMMERCIAL

## 2024-02-14 PROCEDURE — 97530 THERAPEUTIC ACTIVITIES: CPT

## 2024-02-14 PROCEDURE — 97110 THERAPEUTIC EXERCISES: CPT

## 2024-02-14 PROCEDURE — G0283 ELEC STIM OTHER THAN WOUND: HCPCS

## 2024-02-14 NOTE — PROGRESS NOTES
PHYSICAL / OCCUPATIONAL THERAPY - DAILY TREATMENT NOTE    Patient Name: Demario Mullins    Date: 2024    : 1965  Insurance: Payor: BCBS / Plan: BCBS OUT OF STATE / Product Type: *No Product type* /      Patient  verified Yes     Visit #   Current / Total 3 12   Time   In / Out 10:40 11:37   Pain   In / Out 6-7/10 5/10   Subjective Functional Status/Changes: Pt. Reports the exercises last time went ok. He reports he has been doing his HEP.      TREATMENT AREA =  Other low back pain [M54.59]  Neck pain [M54.2]  Right knee pain [M25.561]    OBJECTIVE    Modalities Rationale:     decrease pain and increase tissue extensibility to improve patient's ability to progress to PLOF and address remaining functional goals.    15 min [x] Estim Unattended, type/location:  Left upper trap pre-mod  seated                                    []  w/ice    [x]  w/heat low back and B UT    min [] Estim Attended, type/location:                                     []  w/US     []  w/ice    []  w/heat    []  TENS insruct      min []  Mechanical Traction: type/lbs                   []  pro   []  sup   []  int   []  cont    []  before manual    []  after manual    min []  Ultrasound, settings/location:      min  unbill []  Ice     []  Heat    location/position:     min []  Paraffin,  details:     min []  Vasopneumatic Device, press/temp:     min []  Whirlpool / Fluido:    If using vaso (only need to measure limb vaso being performed on)      pre-treatment girth :       post-treatment girth :       measured at (landmark location) :      min []  Other:    Skin assessment post-treatment:   Intact      Therapeutic Procedures:    Tx Min Billable or 1:1 Min (if diff from Tx Min) Procedure, Rationale, Specifics   34  54542 Therapeutic Exercise (timed):  increase ROM, strength, coordination, balance, and proprioception to improve patient's ability to progress to PLOF and address remaining functional goals. (see flow sheet as applicable)

## 2024-02-20 ENCOUNTER — HOSPITAL ENCOUNTER (OUTPATIENT)
Facility: HOSPITAL | Age: 59
Setting detail: RECURRING SERIES
Discharge: HOME | End: 2024-02-23
Payer: COMMERCIAL

## 2024-02-20 PROCEDURE — G0283 ELEC STIM OTHER THAN WOUND: HCPCS

## 2024-02-20 PROCEDURE — 97530 THERAPEUTIC ACTIVITIES: CPT

## 2024-02-20 PROCEDURE — 97110 THERAPEUTIC EXERCISES: CPT

## 2024-02-20 NOTE — PROGRESS NOTES
PHYSICAL / OCCUPATIONAL THERAPY - DAILY TREATMENT NOTE    Patient Name: Demario Mullins    Date: 2024    : 1965  Insurance: Payor: BCBS / Plan: BCBS OUT OF STATE / Product Type: *No Product type* /      Patient  verified Yes     Visit #   Current / Total 4 12   Time   In / Out 1000 1050   Pain   In / Out 6/10 2/10   Subjective Functional Status/Changes: Pt stated that he is still having pain in the back, neck and face     TREATMENT AREA =  Other low back pain [M54.59]  Neck pain [M54.2]  Right knee pain [M25.561]    OBJECTIVE    Modalities Rationale:     decrease pain and increase tissue extensibility to improve patient's ability to progress to PLOF and address remaining functional goals.    15 min [x] Estim Unattended, type/location: Premod/B UT                                     []  w/ice    [x]  w/heat    min [] Estim Attended, type/location:                                     []  w/US     []  w/ice    []  w/heat    []  TENS insruct      min []  Mechanical Traction: type/lbs                   []  pro   []  sup   []  int   []  cont    []  before manual    []  after manual    min []  Ultrasound, settings/location:      min  unbill []  Ice     []  Heat    location/position:     min []  Paraffin,  details:     min []  Vasopneumatic Device, press/temp:     min []  Whirlpool / Fluido:    If using vaso (only need to measure limb vaso being performed on)      pre-treatment girth :       post-treatment girth :       measured at (landmark location) :      min []  Other:    Skin assessment post-treatment:   Intact      Therapeutic Procedures:    Tx Min Billable or 1:1 Min (if diff from Tx Min) Procedure, Rationale, Specifics     00139 Therapeutic Exercise (timed):  increase ROM, strength, coordination, balance, and proprioception to improve patient's ability to progress to PLOF and address remaining functional goals. (see flow sheet as applicable)     Details if applicable:       10  40515 Therapeutic

## 2024-02-23 ENCOUNTER — HOSPITAL ENCOUNTER (OUTPATIENT)
Facility: HOSPITAL | Age: 59
Setting detail: RECURRING SERIES
Discharge: HOME | End: 2024-02-26
Payer: COMMERCIAL

## 2024-02-23 PROCEDURE — 97110 THERAPEUTIC EXERCISES: CPT

## 2024-02-23 NOTE — PROGRESS NOTES
PHYSICAL / OCCUPATIONAL THERAPY - DAILY TREATMENT NOTE    Patient Name: Demario Mullins    Date: 2024    : 1965  Insurance: Payor: BCBS / Plan: BCBS OUT OF STATE / Product Type: *No Product type* /      Patient  verified yes   Visit #   Current / Total 5 12   Time   In / Out 12:44P 1:40P   Pain   In / Out 0/10-stiff /10   Subjective Functional Status/Changes: Patient reports he was doing some bending at work so he is more stiff.  The bike normally with aggravate his knee and then it calms down. He felt pretty good after last visit. The heat helps but he does not have a heating pad at home. Going up stairs will other his knee/right leg and left calf. His left elbow and right shoulder will pop with getting in and out of bed. He cannot like on his side so sleeps on his back as his arms will be numb. Using the bands helps the arm but he can only do so much at a time.     TREATMENT AREA =  Other low back pain [M54.59]  Neck pain [M54.2]  Right knee pain [M25.561]    OBJECTIVE    Modalities Rationale:     decrease pain and increase tissue extensibility to improve patient's ability to progress to PLOF and address remaining functional goals.    15 (with setup) min [x] Estim Unattended, type/location:  Premod to B UT                                    []  w/ice    [x]  w/heat to low back and B shoulders    min [] Estim Attended, type/location:                                     []  w/US     []  w/ice    []  w/heat    []  TENS insruct      min []  Mechanical Traction: type/lbs                   []  pro   []  sup   []  int   []  cont    []  before manual    []  after manual    min []  Ultrasound, settings/location:      min  unbill []  Ice     []  Heat    location/position:     min []  Paraffin,  details:     min []  Vasopneumatic Device, press/temp:     min []  Whirlpool / Fluido:    If using vaso (only need to measure limb vaso being performed on)      pre-treatment girth :       post-treatment girth :

## 2024-02-27 ENCOUNTER — HOSPITAL ENCOUNTER (OUTPATIENT)
Facility: HOSPITAL | Age: 59
Setting detail: RECURRING SERIES
Discharge: HOME | End: 2024-03-01
Payer: COMMERCIAL

## 2024-02-27 PROCEDURE — 97140 MANUAL THERAPY 1/> REGIONS: CPT

## 2024-02-27 PROCEDURE — 97110 THERAPEUTIC EXERCISES: CPT

## 2024-02-27 PROCEDURE — G0283 ELEC STIM OTHER THAN WOUND: HCPCS

## 2024-02-27 PROCEDURE — 97535 SELF CARE MNGMENT TRAINING: CPT

## 2024-02-27 NOTE — PROGRESS NOTES
Objective Information/Functional Measures/Assessment  Performed c/s exercises in supine including rotation and gentle side bend with hold relax to improve ROM  Hypersensitive to palpation along c/s paraspinals  TTP left anterior shoulder and supraspinatus  Decreased right shoulder pain after joint mobs with minor end range pull and mechanical issue when lifting arm versus pushing arm to reduce UT hiking  Discussed possible getting home TENS unit for pain symptoms  Educated on wedge pillow for l/s support with supine sleeping    Pt making slow, steady progress but remains motivated to return to PLOF. He demonstrates loss of left>right shoulder AROM with pain upon palpation to the anterior and lateral shoulder and guarding with attempts at PROM. He is hypersensitive to palpation but has improved ROM when performed in supine. He continues with forward head and shoulder posture. He did have a pelvic obliquity that may have contributed to ongoing right knee pain. Will reassess in upcoming sessions and work on improving global mobility with decreased pain for return to PLOF.     Patient will continue to benefit from skilled PT / OT services to modify and progress therapeutic interventions, analyze and address functional mobility deficits, analyze and address ROM deficits, analyze and address strength deficits, analyze and address soft tissue restrictions, analyze and cue for proper movement patterns, analyze and modify for postural abnormalities, analyze and address imbalance/dizziness, and instruct in home and community integration to address functional deficits and attain remaining goals.    Progress toward goals / Updated goals:  [x]  See Progress Note/Recertification    Short Term Goals: To be accomplished in 1weeks  Goal: Patient will demonstrate compliance with HEP in order to improve lumbar and cervical mobility for increased ease of ADLs and improved quality of life.    Status at evaluation: n/a  Goal met. Pt

## 2024-03-01 ENCOUNTER — HOSPITAL ENCOUNTER (OUTPATIENT)
Facility: HOSPITAL | Age: 59
Setting detail: RECURRING SERIES
Discharge: HOME | End: 2024-03-04
Payer: COMMERCIAL

## 2024-03-01 PROCEDURE — G0283 ELEC STIM OTHER THAN WOUND: HCPCS

## 2024-03-01 PROCEDURE — 97110 THERAPEUTIC EXERCISES: CPT

## 2024-03-01 PROCEDURE — 97140 MANUAL THERAPY 1/> REGIONS: CPT

## 2024-03-01 NOTE — PROGRESS NOTES
Behrens, Laura M, PTA [facility]   3/8/2024 12:40 PM BassLalo rahman, DARINEL [facility]   3/12/2024 10:40 AM Behrens, Laura M, PTA [facility]   3/15/2024  7:20 AM Lalo Bass, DARINEL [facility]

## 2024-03-05 ENCOUNTER — HOSPITAL ENCOUNTER (OUTPATIENT)
Facility: HOSPITAL | Age: 59
Setting detail: RECURRING SERIES
Discharge: HOME | End: 2024-03-08
Payer: COMMERCIAL

## 2024-03-05 PROCEDURE — G0283 ELEC STIM OTHER THAN WOUND: HCPCS

## 2024-03-05 PROCEDURE — 97530 THERAPEUTIC ACTIVITIES: CPT

## 2024-03-05 PROCEDURE — 97110 THERAPEUTIC EXERCISES: CPT

## 2024-03-05 NOTE — THERAPY RECERTIFICATION
[facility]  [address] [place] [address] - Ph: [phone]   Fx: [phone]  PHYSICAL THERAPY PROGRESS NOTE  [x] Progress Note  [] Discharge Summary    Patient Name: Demario Mullins : 1965   Treatment/Medical Diagnosis: Other low back pain [M54.59]  Neck pain [M54.2]  Right knee pain [M25.561]   Referral Source: César Simental MD     Date of Initial Visit: 24 Attended Visits: 8 Missed Visits: 0       Prior Level of Function: Ind with ADLs, Ind with ambulation   Comorbidities: History of cervical fusion, arthritis, HTN, diabetes       SUMMARY OF TREATMENT  Pt. Is progressing slowly with physical therapy. FOTO score improved to 45 points and he reports a 75% improvement in symptoms since SOC. Lumbar flexion AROM from finger tip to floor improved to 17 cm. Shoulder AROM flexion continues to be limited left: 82 degrees right: 123 degrees. Cervical AROM rotation was limited right: 15 degrees left: 48 degrees. He continues to be compliant with his HEP.     CURRENT STATUS/Progress towards Goals:  Short Term Goals: To be accomplished in 1weeks  Goal: Patient will demonstrate compliance with HEP in order to improve lumbar and cervical mobility for increased ease of ADLs and improved quality of life.    Status at evaluation: n/a  Met     Long Term Goals: To be accomplished in 6 weeks  Goal: Patient will improve FOTO score by 16 points in order to demonstrate a significant improvement in function.   Status at evaluation/last progress note: 40 points   not met    2.   Goal: Patient will improve lumbar flexion AROM finger tip to floor to 19 cm in order to increase ease of household chores   Status at evaluation/last progress note: 29 cm   met    3.   Goal: Patient will improve B cervical rotation AROM to 45 degrees in order to increase ease of driving.   Status at evaluation/last progress note: right: 8 degrees left: 10 degrees   not

## 2024-03-05 NOTE — PROGRESS NOTES
PHYSICAL / OCCUPATIONAL THERAPY - DAILY TREATMENT NOTE    Patient Name: Demario Mullins    Date: 3/5/2024    : 1965  Insurance: Payor: BCBS / Plan: BCBS OUT OF STATE / Product Type: *No Product type* /      Patient  verified Yes     Visit #   Current / Total 8 12   Time   In / Out 9:20 10:18   Pain   In / Out 3/10 1/10   Subjective Functional Status/Changes: Pt. Reports he is doing pretty good today. He continues to have limited left shoulder mobility      TREATMENT AREA =  Other low back pain [M54.59]  Neck pain [M54.2]  Right knee pain [M25.561]    OBJECTIVE    Modalities Rationale:     decrease pain and increase tissue extensibility to improve patient's ability to progress to PLOF and address remaining functional goals.                         15  min [x] Estim Unattended, type/location:             Premod to B c/s paraspinals                                    []  w/ice    [x]  w/heat to low back and B UT     min [] Estim Attended, type/location:                                                []  w/US     []  w/ice    []  w/heat    []  TENS insruct       min []  Mechanical Traction: type/lbs                    []  pro   []  sup   []  int   []  cont    []  before manual    []  after manual     min []  Ultrasound, settings/location:        min  unbill []  Ice     []  Heat    location/position:       min []  Paraffin,  details:       min []  Vasopneumatic Device, press/temp:       min []  Whirlpool / Fluido:     If using vaso (only need to measure limb vaso being performed on)      pre-treatment girth :       post-treatment girth :       measured at (landmark location) :       min []  Other:     Skin assessment post-treatment:   Intact       Therapeutic Procedures:    Tx Min Billable or 1:1 Min (if diff from Tx Min) Procedure, Rationale, Specifics   33  20610 Therapeutic Exercise (timed):  increase ROM, strength, coordination, balance, and proprioception to improve patient's ability to progress to PLOF and

## 2024-03-08 ENCOUNTER — HOSPITAL ENCOUNTER (OUTPATIENT)
Facility: HOSPITAL | Age: 59
Setting detail: RECURRING SERIES
Discharge: HOME | End: 2024-03-11
Payer: COMMERCIAL

## 2024-03-08 PROCEDURE — 97110 THERAPEUTIC EXERCISES: CPT

## 2024-03-08 PROCEDURE — 97140 MANUAL THERAPY 1/> REGIONS: CPT

## 2024-03-08 PROCEDURE — G0283 ELEC STIM OTHER THAN WOUND: HCPCS

## 2024-03-08 NOTE — PROGRESS NOTES
PHYSICAL / OCCUPATIONAL THERAPY - DAILY TREATMENT NOTE    Patient Name: Demario Mullins    Date: 3/8/2024    : 1965  Insurance: Payor: BCBS / Plan: BCBS OUT OF STATE / Product Type: *No Product type* /      Patient  verified Yes     Visit #   Current / Total 1 12   Time   In / Out 12:41 1:43   Pain   In / Out 3/10 0/10   Subjective Functional Status/Changes: Pt. Reports he is moving a little better. He reports right knee is worth with sitting and is on the outside and back of knee     TREATMENT AREA =  Other low back pain [M54.59]  Neck pain [M54.2]  Right knee pain [M25.561]    OBJECTIVE      15  min [x] Estim Unattended, type/location:             Premod to B c/s paraspinals                                    [x]  w/ice right knee    [x]  w/heat to low back and B UT     min [] Estim Attended, type/location:                                                []  w/US     []  w/ice    []  w/heat    []  TENS insruct       min []  Mechanical Traction: type/lbs                    []  pro   []  sup   []  int   []  cont    []  before manual    []  after manual     min []  Ultrasound, settings/location:        min  unbill []  Ice     []  Heat    location/position:       min []  Paraffin,  details:       min []  Vasopneumatic Device, press/temp:       min []  Whirlpool / Fluido:     If using vaso (only need to measure limb vaso being performed on)      pre-treatment girth :       post-treatment girth :       measured at (landmark location) :       min []  Other:     Skin assessment post-treatment:   Intact        Therapeutic Procedures:    Tx Min Billable or 1:1 Min (if diff from Tx Min) Procedure, Rationale, Specifics   37  55808 Therapeutic Exercise (timed):  increase ROM, strength, coordination, balance, and proprioception to improve patient's ability to progress to PLOF and address remaining functional goals. (see flow sheet as applicable)     Details if applicable:  see flow sheet     10  74513 Manual Therapy

## 2024-03-12 ENCOUNTER — HOSPITAL ENCOUNTER (OUTPATIENT)
Facility: HOSPITAL | Age: 59
Setting detail: RECURRING SERIES
Discharge: HOME | End: 2024-03-15
Payer: COMMERCIAL

## 2024-03-12 PROCEDURE — 97140 MANUAL THERAPY 1/> REGIONS: CPT

## 2024-03-12 PROCEDURE — 97535 SELF CARE MNGMENT TRAINING: CPT

## 2024-03-12 PROCEDURE — G0283 ELEC STIM OTHER THAN WOUND: HCPCS

## 2024-03-12 PROCEDURE — 97110 THERAPEUTIC EXERCISES: CPT

## 2024-03-12 NOTE — PROGRESS NOTES
as applicable)     Details if applicable:  see flow sheet     10  06887 Self Care/Home Management (timed):  improve patient knowledge and understanding of pain reducing techniques, positioning, posture/ergonomics, home safety, activity modification, diagnosis/prognosis, and physical therapy expectations, procedures and progression  to improve patient's ability to progress to PLOF and address remaining functional goals.  (see flow sheet as applicable)      Details if applicable:  see flow sheet; using  and how to purchase or tennis ball at home; ice for lateral knee or medial knee pain; progression in therapy alternating work on knees versus shoulders   15  03359 Manual Therapy (timed):  decrease pain, increase ROM, and increase tissue extensibility to improve patient's ability to progress to PLOF and address remaining functional goals.  The manual therapy interventions were performed at a separate and distinct time from the therapeutic activities interventions . (see flow sheet as applicable)     Details if applicable:  leg lengthening for left upslip; MET right AI; shotgun technique; TPR right TFL and ITB; stick massage to right ITB and left adductors   44  [facility] Totals Reminder: bill using total billable min of TIMED therapeutic procedures (example: do not include dry needle or estim unattended, both untimed codes, in totals to left)  8-22 min = 1 unit; 23-37 min = 2 units; 38-52 min = 3 units; 53-67 min = 4 units; 68-82 min = 5 units   Total Total     [x]  Patient Education billed concurrently with other procedures   [x] Review HEP    [] Progressed/Changed HEP, detail:    [] Other detail:       Objective Information/Functional Measures/Assessment  No popping post ITB rolling and TPR to right TFL  Hip flexor hypertonicity B  Challenged with hip abduction but performed after stretching hip flexors  TTP pes anserine on left  No pain in knees post alignment and manual  Educated on self TPR and stick roll

## 2024-03-15 ENCOUNTER — HOSPITAL ENCOUNTER (OUTPATIENT)
Facility: HOSPITAL | Age: 59
Setting detail: RECURRING SERIES
Discharge: HOME | End: 2024-03-18
Payer: COMMERCIAL

## 2024-03-15 PROCEDURE — 97110 THERAPEUTIC EXERCISES: CPT

## 2024-03-15 PROCEDURE — 97140 MANUAL THERAPY 1/> REGIONS: CPT

## 2024-03-15 PROCEDURE — G0283 ELEC STIM OTHER THAN WOUND: HCPCS

## 2024-03-15 NOTE — PROGRESS NOTES
increase tissue extensibility to improve patient's ability to progress to PLOF and address remaining functional goals.  The manual therapy interventions were performed at a separate and distinct time from the therapeutic activities interventions . (see flow sheet as applicable)     Details if applicable:  trigging point release to B UT and cervical paraspinals.  to B TFL and hip adductors          Details if applicable:            Details if applicable:            Details if applicable:     46 25 [facility] Totals Reminder: bill using total billable min of TIMED therapeutic procedures (example: do not include dry needle or estim unattended, both untimed codes, in totals to left)  8-22 min = 1 unit; 23-37 min = 2 units; 38-52 min = 3 units; 53-67 min = 4 units; 68-82 min = 5 units   Total Total     [x]  Patient Education billed concurrently with other procedures   [x] Review HEP    [] Progressed/Changed HEP, detail:    [] Other detail:       Objective Information/Functional Measures/Assessment    Cervical AROM rotation right: 30 left: 40 degrees  Good pelvic alignment today  He was educated on using rolling pin for massage at home  Poor mobility during open books    Assessment:  Pt. Is progressing slowly towards goals. He continues to have significant muscle tightness in B adductors and TFL as well as cervical paraspinals. He does demonstrate some improving in rotation to right side today and continues to be compliant with his HEP. He reports less pain overall but continues to complain of stiffness.     Patient will continue to benefit from skilled PT / OT services to modify and progress therapeutic interventions, analyze and address functional mobility deficits, analyze and address ROM deficits, analyze and address strength deficits, analyze and address soft tissue restrictions, analyze and cue for proper movement patterns, and analyze and modify for postural abnormalities to address functional deficits and

## 2024-03-19 ENCOUNTER — HOSPITAL ENCOUNTER (OUTPATIENT)
Facility: HOSPITAL | Age: 59
Setting detail: RECURRING SERIES
Discharge: HOME | End: 2024-03-22
Payer: COMMERCIAL

## 2024-03-19 PROCEDURE — 97140 MANUAL THERAPY 1/> REGIONS: CPT

## 2024-03-19 PROCEDURE — 97110 THERAPEUTIC EXERCISES: CPT

## 2024-03-19 PROCEDURE — G0283 ELEC STIM OTHER THAN WOUND: HCPCS

## 2024-03-19 PROCEDURE — 97535 SELF CARE MNGMENT TRAINING: CPT

## 2024-03-19 NOTE — PROGRESS NOTES
degrees in supine and left shoulder AROM 110 degrees in sitting both with decreased pain  Supine AAROM flexion B increased ROM painful arc  degrees on left and end range superior shoulder pain  Pectoral tightness bilaterally causing forward shoulder posture  Glute soreness DOMS; educated on gentle stretching    Assessment:  Pt progressing with decreasing knee pain with addition of rolling muscles at home. He is most limited with left shoulder AROM and right neck stiffness. He endorses he was previously told he had B RTC but will likely get an updated MRI referral at next MD appt. He demonstrates good improvement with shoulder AROM with manual techniques to reduce periscapular muscle tightness and exercises that reduce forward shoulder posture. Will continue to progress shoulder mobility, decrease neck tightness and progress hip strength for improved B knee stability for return to PLOF.    Patient will continue to benefit from skilled PT / OT services to modify and progress therapeutic interventions, analyze and address functional mobility deficits, analyze and address ROM deficits, analyze and address strength deficits, analyze and address soft tissue restrictions, analyze and cue for proper movement patterns, and analyze and modify for postural abnormalities to address functional deficits and attain remaining goals.    Progress toward goals / Updated goals:  [x]  See Progress Note/Recertification    Goal: Patient will improve FOTO score by 16 points in order to demonstrate a significant improvement in function.   Status at evaluation/last progress note: 40 points     2.   Goal: Patient will improve cervical rotation AROM to 50 degrees bilaterally in order to increase ease of driving.   Status at evaluation/last progress note: right: 15 degrees left: 48 degrees  Progressing: right: 30 degrees left: 40 degrees (3/15/24)     3.   Goal: Patient will improve B shoulder AROM flexion to 135 degrees in order to

## 2024-03-22 ENCOUNTER — HOSPITAL ENCOUNTER (OUTPATIENT)
Facility: HOSPITAL | Age: 59
Setting detail: RECURRING SERIES
Discharge: HOME | End: 2024-03-25
Payer: COMMERCIAL

## 2024-03-22 PROCEDURE — 97140 MANUAL THERAPY 1/> REGIONS: CPT

## 2024-03-22 PROCEDURE — G0283 ELEC STIM OTHER THAN WOUND: HCPCS

## 2024-03-22 PROCEDURE — 97110 THERAPEUTIC EXERCISES: CPT

## 2024-03-26 ENCOUNTER — HOSPITAL ENCOUNTER (OUTPATIENT)
Facility: HOSPITAL | Age: 59
Setting detail: RECURRING SERIES
Discharge: HOME | End: 2024-03-29
Payer: COMMERCIAL

## 2024-03-26 PROCEDURE — 97110 THERAPEUTIC EXERCISES: CPT

## 2024-03-26 PROCEDURE — 97112 NEUROMUSCULAR REEDUCATION: CPT

## 2024-03-26 PROCEDURE — 97140 MANUAL THERAPY 1/> REGIONS: CPT

## 2024-03-26 PROCEDURE — G0283 ELEC STIM OTHER THAN WOUND: HCPCS

## 2024-03-26 NOTE — PROGRESS NOTES
both with decreased pain  Supine AAROM flexion B increased ROM painful arc  degrees on left and end range superior shoulder pain (3/19/24)     Next PN/ RC due  4/4/24  Auth due (visit number/ date) YFN. 30 per year    PLAN  - Continue Plan of Care    Laura M Behrens, PTA    3/26/2024    7:30 AM    Future Appointments   Date Time Provider Department Center   3/26/2024  8:00 AM Behrens, Laura M, PTA [id number]   3/29/2024  2:00 PM Elham Christian PTA [facility]   4/2/2024  2:40 PM Lalo Bass PT [facility]   4/5/2024 12:40 PM Behrens, Laura M, PTA [facility]   4/9/2024 10:40 AM Behrens, Laura M, PTA [facility]   4/12/2024  2:00 PM Lalo Bass PT [facility]

## 2024-03-29 ENCOUNTER — HOSPITAL ENCOUNTER (OUTPATIENT)
Facility: HOSPITAL | Age: 59
Setting detail: RECURRING SERIES
Discharge: HOME | End: 2024-04-01
Payer: COMMERCIAL

## 2024-03-29 PROCEDURE — 97110 THERAPEUTIC EXERCISES: CPT

## 2024-03-29 PROCEDURE — 97112 NEUROMUSCULAR REEDUCATION: CPT

## 2024-03-29 PROCEDURE — 97140 MANUAL THERAPY 1/> REGIONS: CPT

## 2024-03-29 NOTE — PROGRESS NOTES
PHYSICAL / OCCUPATIONAL THERAPY - DAILY TREATMENT NOTE    Patient Name: Demario Mullins    Date: 3/29/2024    : 1965  Insurance: Payor: BCBS / Plan: BCBS OUT OF STATE / Product Type: *No Product type* /      Patient  verified Yes     Visit #   Current / Total 7 12   Time   In / Out 2:00 3:00   Pain   In / Out 1/10 2/10   Subjective Functional Status/Changes: Pt report pain in right lower back and left arm, his neck is tight.      TREATMENT AREA =  Other low back pain [M54.59]  Neck pain [M54.2]  Right knee pain [M25.561]    OBJECTIVE    Modalities Rationale:     decrease pain and increase tissue extensibility to improve patient's ability to progress to PLOF and address remaining functional goals.    15 min [x] Estim Unattended, type/location: Premod B UT; ice to anterior left shoulder and heat to B UT                                    []  w/ice    [x]  w/heat    min [] Estim Attended, type/location:                                     []  w/US     []  w/ice    []  w/heat    []  TENS insruct      min []  Mechanical Traction: type/lbs                   []  pro   []  sup   []  int   []  cont    []  before manual    []  after manual    min []  Ultrasound, settings/location:      min  unbill []  Ice     []  Heat    location/position:     min []  Paraffin,  details:     min []  Vasopneumatic Device, press/temp:     min []  Whirlpool / Fluido:    If using vaso (only need to measure limb vaso being performed on)      pre-treatment girth :       post-treatment girth :       measured at (landmark location) :      min []  Other:    Skin assessment post-treatment:   Intact      Therapeutic Procedures:    Tx Min Billable or 1:1 Min (if diff from Tx Min) Procedure, Rationale, Specifics   20  63065 Therapeutic Exercise (timed):  increase ROM, strength, coordination, balance, and proprioception to improve patient's ability to progress to PLOF and address remaining functional goals. (see flow sheet as applicable)

## 2024-04-02 ENCOUNTER — HOSPITAL ENCOUNTER (OUTPATIENT)
Facility: HOSPITAL | Age: 59
Setting detail: RECURRING SERIES
Discharge: HOME | End: 2024-04-05
Payer: COMMERCIAL

## 2024-04-02 PROCEDURE — 97140 MANUAL THERAPY 1/> REGIONS: CPT

## 2024-04-02 PROCEDURE — 97110 THERAPEUTIC EXERCISES: CPT

## 2024-04-02 PROCEDURE — 97530 THERAPEUTIC ACTIVITIES: CPT

## 2024-04-02 NOTE — THERAPY RECERTIFICATION
[facility]  [address] [place] [address] - Ph: [phone]   Fx: [phone]  PHYSICAL THERAPY PROGRESS NOTE  [x] Progress Note  [] Discharge Summary    Patient Name: Demario Mullins : 1965   Treatment/Medical Diagnosis: Other low back pain [M54.59]  Neck pain [M54.2]  Right knee pain [M25.561]   Referral Source: César Simental MD     Date of Initial Visit: 24 Attended Visits: 16 Missed Visits: 0       Prior Level of Function: Ind with ADLs, Ind with ambulation   Comorbidities: History of cervical fusion, arthritis, HTN, diabetes       SUMMARY OF TREATMENT   Pt. Is progressing slowly towards goals. He reports having less pain overall and FOTO score has improved slightly to 53 points. His cervical rotation AROM has been improving but he continues to have pain at end ranges especially to left side. Cervical rotation AROM right: 46 degrees left: 45 degrees. Shoulder AROM has also been improving, but continues to be limited right: 149 degrees left: 101 degrees. 30 second sit to stand test was 7x. Skilled PT is medically necessary in order to continue to improve mobility and strength for decreased pain and improved quality of life.     CURRENT STATUS/Progress towards Goals:  Goal: Patient will improve FOTO score by 16 points in order to demonstrate a significant improvement in function.   Status at evaluation/last progress note: 40 points   not met: 53 points    2.   Goal: Patient will improve cervical rotation AROM to 50 degrees bilaterally in order to increase ease of driving.   Status at evaluation/last progress note: right: 15 degrees left: 48 degrees   not met: left: 45 degrees right: 46 degrees    3.   Goal: Patient will improve B shoulder AROM flexion to 135 degrees in order to increase ease of household chores  Status at evaluation/last progress note: right: 123 degrees left: 82 degrees   Not met: right: 139 degrees left:

## 2024-04-02 NOTE — PROGRESS NOTES
Details if applicable:  see flow sheet     12  09197 Therapeutic Activity (timed):  use of dynamic activities replicating functional movements to increase ROM, strength, coordination, balance, and proprioception in order to improve patient's ability to progress to PLOF and address remaining functional goals.  (see flow sheet as applicable)     Details if applicable:  goal re-assessment    10  83235 Manual Therapy (timed):  decrease pain, increase ROM, and increase tissue extensibility to improve patient's ability to progress to PLOF and address remaining functional goals.  The manual therapy interventions were performed at a separate and distinct time from the therapeutic activities interventions . (see flow sheet as applicable)     Details if applicable:  trigger point release to scalenes and cervical paraspinals, grade 3 inf/post shoulder mobs          Details if applicable:            Details if applicable:     43  [facility] Totals Reminder: bill using total billable min of TIMED therapeutic procedures (example: do not include dry needle or estim unattended, both untimed codes, in totals to left)  8-22 min = 1 unit; 23-37 min = 2 units; 38-52 min = 3 units; 53-67 min = 4 units; 68-82 min = 5 units   Total Total     [x]  Patient Education billed concurrently with other procedures   [x] Review HEP    [] Progressed/Changed HEP, detail:    [] Other detail:       Objective Information/Functional Measures/Assessment    FOTO: 53 points  Cervical rotation AROM left: 45 right: 46 degrees  Shoulder flexion AROM right: 149  left: 101 degrees  30 second sit to stand test: 7x  Decreased pain in left face with trigger point release to scalenes    Progress toward goals / Updated goals:  [x]  See Progress Note/Recertification      PLAN  - Continue Plan of Care    Lalo Bass PT    4/2/2024    7:49 AM    Future Appointments   Date Time Provider Department Center   4/2/2024  2:40 PM Lalo Bass PT [facility]

## 2024-04-05 ENCOUNTER — HOSPITAL ENCOUNTER (OUTPATIENT)
Facility: HOSPITAL | Age: 59
Setting detail: RECURRING SERIES
Discharge: HOME | End: 2024-04-08
Payer: COMMERCIAL

## 2024-04-05 PROCEDURE — 97140 MANUAL THERAPY 1/> REGIONS: CPT

## 2024-04-05 PROCEDURE — G0283 ELEC STIM OTHER THAN WOUND: HCPCS

## 2024-04-05 PROCEDURE — 97110 THERAPEUTIC EXERCISES: CPT

## 2024-04-05 PROCEDURE — 97530 THERAPEUTIC ACTIVITIES: CPT

## 2024-04-05 NOTE — PROGRESS NOTES
PHYSICAL / OCCUPATIONAL THERAPY - DAILY TREATMENT NOTE    Patient Name: Demario Mullins    Date: 2024    : 1965  Insurance: Payor: BCBS / Plan: BCBS OUT OF STATE / Product Type: *No Product type* /      Patient  verified Yes     Visit #   Current / Total 1 12   Time   In / Out 12:40 1:45   Pain   In / Out 3/10 l/s; 1/10 c/s and /10 knee 0/10   Subjective Functional Status/Changes: Pt reports being more sore in his right hip today from weeding over the weekend. He states stiffness in his neck as well. He has to make another ortho appt today for his shoulder.      TREATMENT AREA =  Other low back pain [M54.59]  Neck pain [M54.2]  Right knee pain [M25.561]    OBJECTIVE    Modalities Rationale:     decrease pain and increase tissue extensibility to improve patient's ability to progress to PLOF and address remaining functional goals.    15 min [x] Estim Unattended, type/location: premod to left UT                                    []  w/ice    [x]  w/heat    min [] Estim Attended, type/location:                                     []  w/US     []  w/ice    []  w/heat    []  TENS insruct      min []  Mechanical Traction: type/lbs                   []  pro   []  sup   []  int   []  cont    []  before manual    []  after manual    min []  Ultrasound, settings/location:     During estim min  unbill []  Ice     [x]  Heat    location/position: Back and glutes in sitting    min []  Paraffin,  details:     min []  Vasopneumatic Device, press/temp:     min []  Whirlpool / Fluido:    If using vaso (only need to measure limb vaso being performed on)      pre-treatment girth :       post-treatment girth :       measured at (landmark location) :      min []  Other:    Skin assessment post-treatment:   Intact      Therapeutic Procedures:    Tx Min Billable or 1:1 Min (if diff from Tx Min) Procedure, Rationale, Specifics   20  67232 Therapeutic Exercise (timed):  increase ROM, strength, coordination, balance, and

## 2024-04-09 ENCOUNTER — HOSPITAL ENCOUNTER (OUTPATIENT)
Facility: HOSPITAL | Age: 59
Setting detail: RECURRING SERIES
Discharge: HOME | End: 2024-04-12
Payer: COMMERCIAL

## 2024-04-09 PROCEDURE — 97110 THERAPEUTIC EXERCISES: CPT

## 2024-04-09 PROCEDURE — 97535 SELF CARE MNGMENT TRAINING: CPT

## 2024-04-09 PROCEDURE — 97140 MANUAL THERAPY 1/> REGIONS: CPT

## 2024-04-09 PROCEDURE — G0283 ELEC STIM OTHER THAN WOUND: HCPCS

## 2024-04-09 NOTE — PROGRESS NOTES
PHYSICAL / OCCUPATIONAL THERAPY - DAILY TREATMENT NOTE    Patient Name: Demario Mullins    Date: 2024    : 1965  Insurance: Payor: BCBS / Plan: BCBS OUT OF STATE / Product Type: *No Product type* /      Patient  verified Yes     Visit #   Current / Total 2 12   Time   In / Out 10:46 12:06   Pain   In / Out 4/10 left c/s; 2/10 right hip 010   Subjective Functional Status/Changes: Pt reports hips/back was feeling better until sitting for jury duty yesterday and right hip started to bother him more. He also noticed increased left neck symptoms that run into the left side of his face and occasional feeling of hearing waves in his left ear. He gets room spinning dizziness with rolling to the right. When sleeping on his back at night, B hand numbness.      TREATMENT AREA =  Other low back pain [M54.59]  Neck pain [M54.2]  Right knee pain [M25.561]    OBJECTIVE    Modalities Rationale:     decrease pain and increase tissue extensibility to improve patient's ability to progress to PLOF and address remaining functional goals.    15 min [x] Estim Unattended, type/location: premod to left UT                                    []  w/ice    [x]  w/heat    min [] Estim Attended, type/location:                                     []  w/US     []  w/ice    []  w/heat    []  TENS insruct      min []  Mechanical Traction: type/lbs                   []  pro   []  sup   []  int   []  cont    []  before manual    []  after manual    min []  Ultrasound, settings/location:     During estim min  unbill []  Ice     [x]  Heat    location/position: Right hip    min []  Paraffin,  details:     min []  Vasopneumatic Device, press/temp:     min []  Whirlpool / Fluido:    If using vaso (only need to measure limb vaso being performed on)      pre-treatment girth :       post-treatment girth :       measured at (landmark location) :      min []  Other:    Skin assessment post-treatment:   Intact      Therapeutic Procedures:    Tx Min

## 2024-04-12 ENCOUNTER — HOSPITAL ENCOUNTER (OUTPATIENT)
Facility: HOSPITAL | Age: 59
Setting detail: RECURRING SERIES
Discharge: HOME | End: 2024-04-15
Payer: COMMERCIAL

## 2024-04-12 PROCEDURE — 97140 MANUAL THERAPY 1/> REGIONS: CPT

## 2024-04-12 PROCEDURE — 97110 THERAPEUTIC EXERCISES: CPT

## 2024-04-12 NOTE — PROGRESS NOTES
PHYSICAL / OCCUPATIONAL THERAPY - DAILY TREATMENT NOTE    Patient Name: Demario Mullins    Date: 2024    : 1965  Insurance: Payor: BCBS / Plan: BCBS OUT OF STATE / Product Type: *No Product type* /      Patient  verified Yes     Visit #   Current / Total 3 12   Time   In / Out 2:00 2:58   Pain   In / Out 2/10 010   Subjective Functional Status/Changes: Pt. Reports he is feeling about the same today. He continues to have pain in his right lateral leg and left side of his face.      TREATMENT AREA =  Other low back pain [M54.59]  Neck pain [M54.2]  Right knee pain [M25.561]    OBJECTIVE    Modalities Rationale:     decrease pain and increase tissue extensibility to improve patient's ability to progress to PLOF and address remaining functional goals.     min [] Estim Unattended, type/location:                                      []  w/ice    []  w/heat    min [] Estim Attended, type/location:                                     []  w/US     []  w/ice    []  w/heat    []  TENS insruct      min []  Mechanical Traction: type/lbs                   []  pro   []  sup   []  int   []  cont    []  before manual    []  after manual    min []  Ultrasound, settings/location:     10 min  unbill []  Ice     [x]  Heat    location/position: Seated  Neck and right lateral knee    min []  Paraffin,  details:     min []  Vasopneumatic Device, press/temp:     min []  Whirlpool / Fluido:    If using vaso (only need to measure limb vaso being performed on)      pre-treatment girth :       post-treatment girth :       measured at (landmark location) :      min []  Other:    Skin assessment post-treatment:   Intact      Therapeutic Procedures:    Tx Min Billable or 1:1 Min (if diff from Tx Min) Procedure, Rationale, Specifics   33  63667 Therapeutic Exercise (timed):  increase ROM, strength, coordination, balance, and proprioception to improve patient's ability to progress to PLOF and address remaining functional goals. (see

## 2024-05-30 ENCOUNTER — HOSPITAL ENCOUNTER (OUTPATIENT)
Facility: HOSPITAL | Age: 59
Setting detail: RECURRING SERIES
End: 2024-05-30
Payer: COMMERCIAL

## 2024-05-30 PROCEDURE — 97110 THERAPEUTIC EXERCISES: CPT

## 2024-05-30 PROCEDURE — 97530 THERAPEUTIC ACTIVITIES: CPT

## 2024-05-30 PROCEDURE — 97535 SELF CARE MNGMENT TRAINING: CPT

## 2024-05-30 NOTE — THERAPY RECERTIFICATION
[facility]  [address] [place] [address] - Ph: [phone]   Fx: [phone]  PHYSICAL THERAPY PROGRESS NOTE  [x] Progress Note  [] Discharge Summary    Patient Name: Demario Mullins : 1965   Treatment/Medical Diagnosis: Other low back pain [M54.59]  Neck pain [M54.2]  Right knee pain [M25.561]   Referral Source: César Simental MD     Date of Initial Visit: 24 Attended Visits: 20 Missed Visits: 0       Prior Level of Function: Ind with ADLs, Ind with ambulation   Comorbidities: History of cervical fusion, arthritis, HTN, diabetes       SUMMARY OF TREATMENT  Mr. Mullins had a gap in treatment due to attending jury duty, awaiting MD follow ups and conserving sessions due to insurance limitations in regards to visit numbers (30 per calendar year). He has had increased right hip and knee pain (6/10) that is described as sharp after attempting to stand after prolonged sitting and with ambulation but does improve as he moves more. He is tender to palpation along the right gluteus medius with grimacing attempting various hip and back special tests (negative SLR test, negative hip scour test, negative slump test, pain in lateral hip with PJ). His right hip abduction MMT was 4-/5 with pain during testing. He is following up with ortho for right hip and knee on . He did have an ortho appointment for his left shoulder and they will be ordering a MRI for differential diagnosis of rotator cuff tear versus frozen shoulder. He is limited with left shoulder flexion to 95 degrees in standing with pain. He has been getting cervicogenic headaches left>right along the scalene referral pattern and was educated on first and second rib mobilizations due to limitations of c/s mobility from C3-C6 fusion. He has loss c/s AROM rotation since last assessed (right 32 degrees; left 38 degrees). His sit to stand test did improve to 10x in 30

## 2024-05-30 NOTE — PROGRESS NOTES
PHYSICAL / OCCUPATIONAL THERAPY - DAILY TREATMENT NOTE    Patient Name: Demario Mullins    Date: 2024    : 1965  Insurance: Payor: BCBS / Plan: BCBS OUT OF STATE / Product Type: *No Product type* /      Patient  verified Yes     Visit #   Current / Total 4 12   Time   In / Out 4:00 4:40   Pain   In / Out 6/10 hip 6/10 hip   Subjective Functional Status/Changes: Pt report ortho for hip/knee right on . He is awaiting a call to schedule a MRI for the left shoulder to rule out rotator cuff tear versus frozen shoulder. He is aware of only 10 visits left of PT. He has been having sharp right hip pain that starts at the knee and travels up after going from sitting to standing and that he has to \"walk out some\". He has been getting side of the head headaches at times with prolonged looking down or prolonged activities. His back does continue to bother him but more tightness compared to other pain symptoms. He is worried about his left shoulder and lack of motion. His right shoulder will also pop but the MD is most looking at the left shoulder currently.      TREATMENT AREA =  Other low back pain [M54.59]  Neck pain [M54.2]  Right knee pain [M25.561]    OBJECTIVE    Therapeutic Procedures:    Tx Min Billable or 1:1 Min (if diff from Tx Min) Procedure, Rationale, Specifics   10  73963 Therapeutic Exercise (timed):  increase ROM, strength, coordination, balance, and proprioception to improve patient's ability to progress to PLOF and address remaining functional goals. (see flow sheet as applicable)     Details if applicable:  see flow sheet     10  66870 Self Care/Home Management (timed):  improve patient knowledge and understanding of pain reducing techniques, positioning, posture/ergonomics, home safety, activity modification, diagnosis/prognosis, and physical therapy expectations, procedures and progression  to improve patient's ability to progress to PLOF and address remaining functional goals.  (see  flow sheet as applicable)     Details if applicable:  therapy plan; calling post MD appts and diagnostic testing; discussing with  therapy visits due to insurance limitation   20  01262 Therapeutic Activity (timed):  use of dynamic activities replicating functional movements to increase ROM, strength, coordination, balance, and proprioception in order to improve patient's ability to progress to PLOF and address remaining functional goals.  (see flow sheet as applicable)      Details if applicable:  goal reassessment   40  [facility] Totals Reminder: bill using total billable min of TIMED therapeutic procedures (example: do not include dry needle or estim unattended, both untimed codes, in totals to left)  8-22 min = 1 unit; 23-37 min = 2 units; 38-52 min = 3 units; 53-67 min = 4 units; 68-82 min = 5 units   Total Total     [x]  Patient Education billed concurrently with other procedures   [x] Review HEP    [] Progressed/Changed HEP, detail:    [] Other detail:       Objective Information/Functional Measures/Assessment  FOTO: 57/100  Cervical rotation AROM: left 38 degrees right 32 degrees  Left shoulder AROM flexion: 95 degrees  30 second sit to stand: 10x  TTP right gluteus medius  Right hip abduction MMT: 4-/5 with pain  Negative SLR test, negative hip scour test, negative slump test, negative PJ/FADER    See Progress Note.     Patient will continue to benefit from skilled PT / OT services to modify and progress therapeutic interventions, analyze and address functional mobility deficits, analyze and address ROM deficits, analyze and address strength deficits, analyze and address soft tissue restrictions, analyze and cue for proper movement patterns, analyze and modify for postural abnormalities, and analyze and address imbalance/dizziness to address functional deficits and attain remaining goals.    Progress toward goals / Updated goals:  [x]  See Progress Note/Recertification    Next PN/ RC due

## 2024-06-10 ENCOUNTER — APPOINTMENT (OUTPATIENT)
Facility: HOSPITAL | Age: 59
End: 2024-06-10
Payer: COMMERCIAL

## 2024-06-13 ENCOUNTER — APPOINTMENT (OUTPATIENT)
Facility: HOSPITAL | Age: 59
End: 2024-06-13
Payer: COMMERCIAL

## 2024-06-17 ENCOUNTER — HOSPITAL ENCOUNTER (OUTPATIENT)
Facility: HOSPITAL | Age: 59
Setting detail: RECURRING SERIES
Discharge: HOME | End: 2024-06-20
Payer: COMMERCIAL

## 2024-06-17 PROCEDURE — 97110 THERAPEUTIC EXERCISES: CPT

## 2024-06-17 PROCEDURE — 97140 MANUAL THERAPY 1/> REGIONS: CPT

## 2024-06-17 PROCEDURE — 97530 THERAPEUTIC ACTIVITIES: CPT

## 2024-06-17 PROCEDURE — 97535 SELF CARE MNGMENT TRAINING: CPT

## 2024-06-17 NOTE — PROGRESS NOTES
PHYSICAL / OCCUPATIONAL THERAPY - DAILY TREATMENT NOTE    Patient Name: Demario Mullins    Date: 2024    : 1965  Insurance: Payor: BCBS / Plan: BCBS OUT OF STATE / Product Type: *No Product type* /      Patient  verified Yes     Visit #   Current / Total 1 4-8   Time   In / Out 2:00 3:00   Pain   In / Out 4/10 3/10   Subjective Functional Status/Changes: Pt reports increase of right heel pain (spur) that has been more irritated the last week. He states MD offered injection to the knee or Meloxicam (realized he couldn't take due to allergies). He doesn't want to get an injection if he doesn't have to. Has been feeling okay in the shoulder and neck but had to replace a starter for a friend the other day which flared his neck some. His left knee has been worse recently feeling like it might buckle.      TREATMENT AREA =  Other low back pain [M54.59]  Neck pain [M54.2]  Right knee pain [M25.561]    OBJECTIVE    Therapeutic Procedures:    Tx Min Billable or 1:1 Min (if diff from Tx Min) Procedure, Rationale, Specifics   15  15727 Therapeutic Exercise (timed):  increase ROM, strength, coordination, balance, and proprioception to improve patient's ability to progress to PLOF and address remaining functional goals. (see flow sheet as applicable)     Details if applicable:  updated HEP for foot stretches     20  60772 Self Care/Home Management (timed):  improve patient knowledge and understanding of pain reducing techniques, positioning, posture/ergonomics, home safety, activity modification, diagnosis/prognosis, and physical therapy expectations, procedures and progression  to improve patient's ability to progress to PLOF and address remaining functional goals.  (see flow sheet as applicable)      Details if applicable: therapy plan/scheduling; cane adjustment and use depending on pain; knee pain options regarding injections versus bracing versus topical   10  87342 Manual Therapy (timed):  decrease pain, 
Detail Level: Zone
Continue Regimen: Humira
Discontinue Regimen: folic acid 1 mg tablet QD\\nmethotrexate sodium 2.5 mg tablet PO\\nFluocinolone 0.025% cream

## 2024-06-26 ENCOUNTER — HOSPITAL ENCOUNTER (OUTPATIENT)
Facility: HOSPITAL | Age: 59
Setting detail: RECURRING SERIES
Discharge: HOME | End: 2024-06-29
Payer: COMMERCIAL

## 2024-06-26 PROCEDURE — 97535 SELF CARE MNGMENT TRAINING: CPT

## 2024-06-26 PROCEDURE — 97110 THERAPEUTIC EXERCISES: CPT

## 2024-06-26 PROCEDURE — 97530 THERAPEUTIC ACTIVITIES: CPT

## 2024-06-26 NOTE — PROGRESS NOTES
PHYSICAL / OCCUPATIONAL THERAPY - DAILY TREATMENT NOTE    Patient Name: Demario Mullins    Date: 2024    : 1965  Insurance: Payor: BCBS / Plan: BCBS OUT OF STATE / Product Type: *No Product type* /      Patient  verified Yes     Visit #   Current / Total 2 4-8   Time   In / Out 3:22 4:20   Pain   In / Out 7/10 4/10   Subjective Functional Status/Changes: Pt reports increased right heel pain and left knee pain. He tried to switch from his boots to sketchers to help his pain but he can barely walk. 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 He reports shoulder is still limited but not as painful. He still has tightness trying to turn his head when driving.      TREATMENT AREA =  Other low back pain [M54.59]  Neck pain [M54.2]  Right knee pain [M25.561]    OBJECTIVE    Therapeutic Procedures:    Tx Min Billable or 1:1 Min (if diff from Tx Min) Procedure, Rationale, Specifics   13  95511 Therapeutic Exercise (timed):  increase ROM, strength, coordination, balance, and proprioception to improve patient's ability to progress to PLOF and address remaining functional goals. (see flow sheet as applicable)     Details if applicable:  see flow sheet     15  15292 Self Care/Home Management (timed):  improve patient knowledge and understanding of pain reducing techniques, positioning, posture/ergonomics, home safety, activity modification, diagnosis/prognosis, and physical therapy expectations, procedures and progression  to improve patient's ability to progress to PLOF and address remaining functional goals.  (see flow sheet as applicable)      Details if applicable:  Orthotic use; SPC use; information for walk in ortho clinic; therapy plan; icing education   30  45484 Therapeutic Activity (timed):  use of dynamic activities replicating functional movements to increase ROM, strength, coordination, balance, and proprioception in order to improve patient's ability to progress to PLOF and

## 2024-06-27 NOTE — THERAPY RECERTIFICATION
[facility] - [facility]  [address] [place] [address] - Ph: [phone]   Fx: [phone]  PHYSICAL THERAPY PROGRESS NOTE  [x] Progress Note  [] Discharge Summary    Patient Name: Demario Mullins : 1965   Treatment/Medical Diagnosis: Other low back pain [M54.59]  Neck pain [M54.2]  Right knee pain [M25.561]   Referral Source: César Simental MD     Date of Initial Visit: 24 Attended Visits: 22 Missed Visits: 0       Prior Level of Function: Ind with ADLs, Ind with ambulation   Comorbidities: History of cervical fusion, arthritis, HTN, diabetes       SUMMARY OF TREATMENT  Mr. Mullins is making slow progress due to only being able to schedule 1 visit per week due to insurance limitations in regards to visit numbers allotted by his insurance plan (30 per calendar year). He has had overall reduction in right hip pain to 5/10 at worst and his right knee pain has resolved. However, he has in recent weeks had a flare up of right heel pain (heel spur at plantar fascia insertion) causing antalgic gait and increased left knee pain medially due to weight shift compensation. His left shoulder pain has decreased however he continues to demonstrate decreased AROM flexion at 118 degrees although improved since last assessment. His c/s AROM rotation remains limited (C/s rotation AROM: left 29 degrees right 31 degrees) with hypertonicity of the surrounding c/s musculature from underlying C3-C6 fusion and hypomobility in the t/s and lower c/s. He has been compliant with therapy recommendations in regards to self manual techniques with massage, stretching and strengthening and has been using heat/ice to assist with pain symptoms. Skilled PT remains medically necessary to progress independence with focused work on t/s and lower c/s mobility, c/s ROM and progression of left shoulder AROM to improve ease of completing ADLs, driving and work specific duties. Will

## 2024-07-03 ENCOUNTER — HOSPITAL ENCOUNTER (OUTPATIENT)
Facility: HOSPITAL | Age: 59
Setting detail: RECURRING SERIES
Discharge: HOME | End: 2024-07-06
Payer: COMMERCIAL

## 2024-07-03 PROCEDURE — 97530 THERAPEUTIC ACTIVITIES: CPT

## 2024-07-03 PROCEDURE — 97535 SELF CARE MNGMENT TRAINING: CPT

## 2024-07-03 PROCEDURE — 97110 THERAPEUTIC EXERCISES: CPT

## 2024-07-03 PROCEDURE — 97140 MANUAL THERAPY 1/> REGIONS: CPT

## 2024-07-03 NOTE — PROGRESS NOTES
modify for postural abnormalities, and analyze and address imbalance/dizziness to address functional deficits and attain remaining goals.    Progress toward goals / Updated goals:  [x]  See Progress Note/Recertification    New Goals to be achieved in __4__ WEEKS     1. Goal: Patient will improve cervical rotation AROM to 50 degrees bilaterally in order to increase ease of driving.   Status at evaluation/last progress note: C/s rotation AROM: left 29 degrees right 31 degrees      2.  Goal: Patient will improve left shoulder AROM flexion to 135 degrees in order to increase ease of household chores  Status at evaluation/last progress note: 118 degrees     3. Goal: Patient will report right hip pain at worst <=4/10 to improve ease of ambulation and performing sit to stand transfers.  Status at evaluation/last progress note: 5/10  Progressing as right heel has calmed down (7/3/24)    Next PN/ RC due 07/26/2024  Auth due (visit number/ date) 30 visits    PLAN  - Continue Plan of Care      Laura M Behrens, PTA    7/3/2024    7:20 AM    Future Appointments   Date Time Provider Department Center   7/3/2024  3:20 PM Behrens, Laura M, PTA [facility]   7/10/2024  3:20 PM Behrens, Laura M, PTA [facility]   7/24/2024  3:20 PM Behrens, Laura M, PTA [facility]

## 2024-07-10 ENCOUNTER — HOSPITAL ENCOUNTER (OUTPATIENT)
Facility: HOSPITAL | Age: 59
Setting detail: RECURRING SERIES
Discharge: HOME | End: 2024-07-13
Payer: COMMERCIAL

## 2024-07-10 PROCEDURE — 97110 THERAPEUTIC EXERCISES: CPT

## 2024-07-10 PROCEDURE — 97530 THERAPEUTIC ACTIVITIES: CPT

## 2024-07-10 PROCEDURE — 97140 MANUAL THERAPY 1/> REGIONS: CPT

## 2024-07-10 NOTE — PROGRESS NOTES
PHYSICAL / OCCUPATIONAL THERAPY - DAILY TREATMENT NOTE    Patient Name: Demario Mullins    Date: 7/10/2024    : 1965  Insurance: Payor: BCBS / Plan: BCBS OUT OF STATE / Product Type: *No Product type* /      Patient  verified Yes     Visit #   Current / Total 2 4-8   Time   In / Out 3:21 4:30   Pain   In / Out 4-5/10 3/10   Subjective Functional Status/Changes: Pt reports able to take felt out of shoe and heel pain is still much better; cancelled ortho for foot today. He states the tape helped his left knee so he is looking for his brace to start using again for work. He has had an increased in right shoulder pain recently and neck pain. He has been completing more written reports at work and does note increased UE use with transfers when knees and foot were bothering him. He has old RTC tear in right shoulder; last MRI for left they said frozen shoulder.      TREATMENT AREA =  Other low back pain [M54.59]  Neck pain [M54.2]  Right knee pain [M25.561]    OBJECTIVE    Therapeutic Procedures:    Tx Min Billable or 1:1 Min (if diff from Tx Min) Procedure, Rationale, Specifics   40 21 37523 Therapeutic Exercise (timed):  increase ROM, strength, coordination, balance, and proprioception to improve patient's ability to progress to PLOF and address remaining functional goals. (see flow sheet as applicable)     Details if applicable:  see flow sheet     8 8 73318 Manual Therapy (timed):  decrease pain, increase ROM, and increase tissue extensibility to improve patient's ability to progress to PLOF and address remaining functional goals.  The manual therapy interventions were performed at a separate and distinct time from the therapeutic activities interventions . (see flow sheet as applicable)      Details if applicable: SOR; UT TPR   21 21 69703 Therapeutic Activity (timed):  use of dynamic activities replicating functional movements to increase ROM, strength, coordination, balance, and proprioception in order to

## 2024-07-24 ENCOUNTER — HOSPITAL ENCOUNTER (OUTPATIENT)
Facility: HOSPITAL | Age: 59
Setting detail: RECURRING SERIES
Discharge: HOME | End: 2024-07-27
Payer: COMMERCIAL

## 2024-07-24 PROCEDURE — 97110 THERAPEUTIC EXERCISES: CPT

## 2024-07-24 PROCEDURE — 97530 THERAPEUTIC ACTIVITIES: CPT

## 2024-07-24 PROCEDURE — 97535 SELF CARE MNGMENT TRAINING: CPT

## 2024-07-24 PROCEDURE — 97112 NEUROMUSCULAR REEDUCATION: CPT

## 2024-07-24 NOTE — THERAPY RECERTIFICATION
[facility]  [address] [place] [address] - Ph: [phone]   Fx: [phone]  PHYSICAL THERAPY PROGRESS NOTE  [x] Progress Note  [] Discharge Summary    Patient Name: Demario Mullins : 1965   Treatment/Medical Diagnosis: Other low back pain [M54.59]  Neck pain [M54.2]  Right knee pain [M25.561]   Referral Source: César Simental MD     Date of Initial Visit: 24 Attended Visits: 25 Missed Visits: 0       Prior Level of Function: Ind with ADLs, Ind with ambulation   Comorbidities: History of cervical fusion, arthritis, HTN, diabetes       SUMMARY OF TREATMENT  Mr. Mullins is making steady progress towards updated goals in therapy and has been highly compliant with therapy recommendations and exercises due to decreased frequency to conserve insurance visit limitations per contract year. He has been able to independently manage his right hip, B knee and right heel pain with exercises provided and utilizing ice and massage techniques. He has been having more pain in his right shoulder since carrying a chair and running into the side wall with it when trying to avoid bumping into his friend's child. He demonstrates TTP right anterior shoulder and pain with AROM of the shoulder into abduction. He has forward head and shoulder posture contributing to levator scapulae hypertonicity, pectoral tightness and poor scapulohumeral rhythm which has likely further irritated his right shoulder on top of recent injury and underlying RTC derangement. He was taking a prednisone pack for shingles which may have also helped alleviate some underlying inflammation that was causing his other pain symptoms and have contributed to improved c/s and left shoulder AROM. Skilled PT remains medically necessary to ensure understanding and independent progression of postural exercises to improve both awareness and endurance in order to reduce final pain complaints

## 2024-07-24 NOTE — PROGRESS NOTES
PHYSICAL / OCCUPATIONAL THERAPY - DAILY TREATMENT NOTE    Patient Name: Demario Mullins    Date: 2024    : 1965  Insurance: Payor: BCBS / Plan: BCBS OUT OF STATE / Product Type: *No Product type* /      Patient  verified Yes     Visit #   Current / Total 3 4-8   Time   In / Out 3:23 4:15   Pain   In / Out 7/10 right shoulder 2/10   Subjective Functional Status/Changes: Pt reports he was carrying a chair for a neighbor and tried to avoid her daughter and ran the chair into the side causing him to jar his right shoulder. He has had more pain in it since in the front of the shoulder. He feels his hip and knee and foot have been manageable with exercises and interventions provided. He would like to do another session to review shoulder and neck exercises and thinks he may be ready for D/C at that time.      TREATMENT AREA =  Other low back pain [M54.59]  Neck pain [M54.2]  Right knee pain [M25.561]    OBJECTIVE    Therapeutic Procedures:    Tx Min Billable or 1:1 Min (if diff from Tx Min) Procedure, Rationale, Specifics   20  64652 Therapeutic Exercise (timed):  increase ROM, strength, coordination, balance, and proprioception to improve patient's ability to progress to PLOF and address remaining functional goals. (see flow sheet as applicable)     Details if applicable:  see flow sheet     12  30206 Self Care/Home Management (timed):  improve patient knowledge and understanding of pain reducing techniques, positioning, posture/ergonomics, home safety, activity modification, diagnosis/prognosis, and physical therapy expectations, procedures and progression  to improve patient's ability to progress to PLOF and address remaining functional goals.  (see flow sheet as applicable)      Details if applicable: theracane use; ice for shoulder; posture education; head posture   10  11923 Neuromuscular Re-Education (timed):  improve balance, coordination, kinesthetic sense, posture, core stability and proprioception

## 2024-08-07 ENCOUNTER — HOSPITAL ENCOUNTER (OUTPATIENT)
Facility: HOSPITAL | Age: 59
Setting detail: RECURRING SERIES
Discharge: HOME | End: 2024-08-10
Payer: COMMERCIAL

## 2024-08-07 PROCEDURE — 97110 THERAPEUTIC EXERCISES: CPT

## 2024-08-07 PROCEDURE — 97535 SELF CARE MNGMENT TRAINING: CPT

## 2024-08-07 PROCEDURE — 97530 THERAPEUTIC ACTIVITIES: CPT

## 2024-08-07 NOTE — PROGRESS NOTES
with a call to find out if pt was able to schedule appt for ongoing right shoulder pain.     Patient will continue to benefit from skilled PT / OT services to modify and progress therapeutic interventions, analyze and address functional mobility deficits, analyze and address ROM deficits, analyze and address strength deficits, analyze and address soft tissue restrictions, analyze and cue for proper movement patterns, analyze and modify for postural abnormalities, and analyze and address imbalance/dizziness to address functional deficits and attain remaining goals.    Progress toward goals / Updated goals:  [x]  See Progress Note/Recertification    1. Goal: Patient will improve cervical rotation AROM to 50 degrees bilaterally in order to increase ease of driving.   Status at evaluation/last progress note: Cervical rotation AROM: left 46 degrees right 42 degrees      2. Goal: Patient will be independent with comprehensive home program for self management of pain and progressive stretching/strengthening to improve posture awareness and decrease pain symptoms for ease of completing work tasks, ADLs and prolonged standing and walking.  Status at evaluation/last progress note: initiated postural exercise HEP  Met (8/7/24)     Next PN/ RC due 08/23/2024  Auth due (visit number/ date) 30 visits    PLAN  - Continue Plan of Care      Laura M Behrens, PTA    8/7/2024    7:51 AM    Future Appointments   Date Time Provider Department Center   8/7/2024  3:20 PM [facility]

## 2024-08-23 ENCOUNTER — HOSPITAL ENCOUNTER (OUTPATIENT)
Facility: HOSPITAL | Age: 59
Setting detail: RECURRING SERIES
Discharge: HOME | End: 2024-08-26
Payer: COMMERCIAL

## 2024-08-23 PROCEDURE — 97110 THERAPEUTIC EXERCISES: CPT

## 2024-08-23 PROCEDURE — 97530 THERAPEUTIC ACTIVITIES: CPT

## 2024-08-23 PROCEDURE — 97112 NEUROMUSCULAR REEDUCATION: CPT

## 2024-08-23 NOTE — PROGRESS NOTES
PHYSICAL / OCCUPATIONAL THERAPY - DAILY TREATMENT NOTE    Patient Name: Demario Mullins    Date: 2024    : 1965  Insurance: Payor: BCBS / Plan: BCBS OUT OF STATE / Product Type: *No Product type* /      Patient  verified Yes     Visit #   Current / Total 2 4   Time   In / Out 1:20 2:11   Pain   In / Out 8/10 5/10   Subjective Functional Status/Changes: Pt. Reports having a lot of pain in his shoulder today. He reports x-ray showed bone spur and he is scheduled for an MRI. He also reports his knees continue to \"lock up\" after prolonged sitting and he has to walk a bit for them to loosen up.      TREATMENT AREA =  Other low back pain [M54.59]  Neck pain [M54.2]  Right knee pain [M25.561]  Right shoulder pain [M25.511]     OBJECTIVE    Modalities Rationale:     decrease pain and increase tissue extensibility to improve patient's ability to progress to PLOF and address remaining functional goals.     min [] Estim Unattended, type/location:                                      []  w/ice    []  w/heat    min [] Estim Attended, type/location:                                     []  w/US     []  w/ice    []  w/heat    []  TENS insruct      min []  Mechanical Traction: type/lbs                   []  pro   []  sup   []  int   []  cont    []  before manual    []  after manual    min []  Ultrasound, settings/location:     10 min  unbill []  Ice     [x]  Heat    location/position: Seated  B UT    min []  Paraffin,  details:     min []  Vasopneumatic Device, press/temp:     min []  Whirlpool / Fluido:    If using vaso (only need to measure limb vaso being performed on)      pre-treatment girth :       post-treatment girth :       measured at (landmark location) :      min []  Other:    Skin assessment post-treatment:   Intact      Therapeutic Procedures:    Tx Min Billable or 1:1 Min (if diff from Tx Min) Procedure, Rationale, Specifics   22  91697 Therapeutic Exercise (timed):  increase ROM, strength, coordination,  indicated later this year.     Progress toward goals / Updated goals:  [x]  See Progress Note/Recertification    PLAN  - Discharge due to : plateau in progress    Lalo Bass PT    8/23/2024    7:03 AM  If an interpreting service was utilized for treatment of this patient, the contents of this document represent the material reviewed with the patient via the .     Future Appointments   Date Time Provider Department Center   8/23/2024  1:20 PM Lalo Bass PT [facility]

## 2024-08-27 NOTE — THERAPY DISCHARGE
[facility] - [facility]  [address] [place] [address]  [phone] fax    Physical Therapy Discharge Summary    Patient Name: Demario Mullins : 1965   Medical   Diagnosis: Other low back pain [M54.59]  Neck pain [M54.2] Treatment Diagnosis: M25.561  RIGHT KNEE PAIN and M25.562  LEFT KNEE PAIN  and M54.2  NECK PAIN and M54.59  OTHER LOWER BACK PAIN      Onset Date: 24 Payor :  Payor: BCBS / Plan: BCBS OUT OF STATE / Product Type: *No Product type* /    Referral Source: César Simental MD Start of Care (SOC): 2024   Prior Hospitalization: See medical history Provider #: [id number]   Prior Level of Function: Ind with ADLs, Ind with ambulation   Comorbidities: History of cervical fusion, arthritis, HTN, diabetes     Visits from Start of Care: 27    Missed Visits: 0    Reporting Period : 24 to 24    Summary of Care:  Goal: Patient will improve cervical rotation AROM to 50 degrees bilaterally in order to increase ease of driving.   Status at evaluation/last progress note: Cervical rotation AROM: left 46 degrees right 42 degrees   Status at discharge: not met    Goal: Patient will be independent with comprehensive home program for self management of pain and progressive stretching/strengthening to improve posture awareness and decrease pain symptoms for ease of completing work tasks, ADLs and prolonged standing and walking.  Status at evaluation/last progress note: initiated postural exercise HEP  Status at discharge: met    Mr. Mullins is making steady progress towards updated goals in therapy and has been highly compliant with therapy recommendations and exercises due to decreased frequency to conserve insurance visit limitations per contract year. He has been able to independently manage his right hip, B knee and right heel pain with exercises provided and utilizing ice and massage techniques. He has been having more pain in his right shoulder

## 2025-05-13 NOTE — PROGRESS NOTE ADULT - PROBLEM SELECTOR PLAN 1
Detail Level: Zone
Currently chest pain free, EKG upon admission [id number] with no acute ST/T wave changes  - CXR unremarkable  - cardiac enzymes negative x 3   - COVID PCR negative   - repeat EKG in AM w/o ischemic changes  - Echo: mild symmetric LV hypertrophy, normal LV and RV size and function, EF 65%, doppler suggestive of grade II diastolic function but not conclusive, no valvular disease, no pulmonary hypertension, and no pericardial effusion.